# Patient Record
Sex: FEMALE | Race: BLACK OR AFRICAN AMERICAN | Employment: FULL TIME | URBAN - METROPOLITAN AREA
[De-identification: names, ages, dates, MRNs, and addresses within clinical notes are randomized per-mention and may not be internally consistent; named-entity substitution may affect disease eponyms.]

---

## 2017-03-13 DIAGNOSIS — G43.009 MIGRAINE WITHOUT AURA AND WITHOUT STATUS MIGRAINOSUS, NOT INTRACTABLE: ICD-10-CM

## 2017-03-14 RX ORDER — BUTALBITAL, ACETAMINOPHEN AND CAFFEINE 50; 325; 40 MG/1; MG/1; MG/1
TABLET ORAL
Qty: 30 TAB | Refills: 2 | OUTPATIENT
Start: 2017-03-14 | End: 2018-01-15 | Stop reason: SDUPTHER

## 2017-03-23 ENCOUNTER — OFFICE VISIT (OUTPATIENT)
Dept: INTERNAL MEDICINE CLINIC | Age: 47
End: 2017-03-23

## 2017-03-23 VITALS
WEIGHT: 172.4 LBS | BODY MASS INDEX: 31.73 KG/M2 | DIASTOLIC BLOOD PRESSURE: 68 MMHG | HEART RATE: 81 BPM | RESPIRATION RATE: 19 BRPM | TEMPERATURE: 97.9 F | SYSTOLIC BLOOD PRESSURE: 100 MMHG | HEIGHT: 62 IN | OXYGEN SATURATION: 98 %

## 2017-03-23 DIAGNOSIS — F51.01 PRIMARY INSOMNIA: Primary | ICD-10-CM

## 2017-03-23 NOTE — MR AVS SNAPSHOT
Visit Information Date & Time Provider Department Dept. Phone Encounter #  
 3/23/2017  2:00 PM [facility] [id number] Upcoming Health Maintenance Date Due DTaP/Tdap/Td series (1 - Tdap) 8/30/1989 INFLUENZA AGE 9 TO ADULT 8/1/2016 PAP AKA CERVICAL CYTOLOGY 11/12/2017 Allergies as of 3/23/2017  Review Complete On: 3/23/2017 By: Boris Olivera MD  
  
 Severity Noted Reaction Type Reactions Shellfish Derived  12/23/2014    Hives, Itching, Swelling Eye swelling Sulfa (Sulfonamide Antibiotics)  12/23/2014    Other (comments) Abdominal pain Current Immunizations  Never Reviewed Name Date Influenza Vaccine 11/1/2014 Not reviewed this visit You Were Diagnosed With   
  
 Codes Comments Primary insomnia    -  Primary ICD-10-CM: F51.01 
ICD-9-CM: 307.42 Vitals BP Pulse Temp Resp Height(growth percentile) Weight(growth percentile) 100/68 (BP 1 Location: Right arm, BP Patient Position: Sitting) 81 97.9 °F (36.6 °C) (Oral) 19 5' 2\" (1.575 m) 172 lb 6.4 oz (78.2 kg) LMP SpO2 BMI OB Status Smoking Status 03/13/2017 98% 31.53 kg/m2 Having regular periods Never Smoker Vitals History BMI and BSA Data Body Mass Index Body Surface Area  
 31.53 kg/m 2 1.85 m 2 Preferred Pharmacy Pharmacy Name Phone [id number]- [facility] [address] [phone] Your Updated Medication List  
  
   
This list is accurate as of: 3/23/17  2:59 PM.  Always use your most recent med list.  
  
  
  
  
 butalbital-acetaminophen-caffeine -40 mg per tablet Commonly known as:  FIORICET, ESGIC  
TAKE 1 TABLET EVERY 4 HOURS AS NEEDED FOR HEADACHE * ibuprofen 800 mg tablet Commonly known as:  MOTRIN Take 1 Tab by mouth every eight (8) hours as needed for Pain. * ibuprofen 800 mg tablet Commonly known as:  MOTRIN  
TAKE 1 TABLET BY MOUTH EVERY 8 HOURS AS NEEDED FOR PAIN  
  
 melatonin Tab tablet Take  by mouth nightly. multivitamin tablet Commonly known as:  ONE A DAY Take 1 tablet by mouth daily. suvorexant 5 mg tablet Commonly known as:  [place] Take 1 Tab by mouth nightly as needed for Insomnia. Max Daily Amount: 5 mg.  
  
 tolterodine ER 2 mg ER capsule Commonly known as:  DETROL-LA  
TAKE ONE CAPSULE EVERY DAY  
  
 * Notice: This list has 2 medication(s) that are the same as other medications prescribed for you. Read the directions carefully, and ask your doctor or other care provider to review them with you. Prescriptions Printed Refills  
 suvorexant (BELSOMRA) 5 mg tablet 3 Sig: Take 1 Tab by mouth nightly as needed for Insomnia. Max Daily Amount: 5 mg. Class: Print Route: Oral  
  
Patient Instructions Suvorexant (By mouth) Suvorexant (gic-dak-QYG-ant) Treats insomnia. Brand Name(s):Belsomra There may be other brand names for this medicine. When This Medicine Should Not Be Used: This medicine is not right for everyone. Do not use it if you have narcolepsy. How to Use This Medicine:  
Tablet · Your doctor will tell you how much medicine to use. Do not use more than directed. · You should not take this medicine if you are not able to sleep or rest for at least 7 hours before you need to be active again. · This medicine works better if you do not take it with food or right after a meal. 
· This medicine should come with a Medication Guide. Ask your pharmacist for a copy if you do not have one. · Missed dose: This medicine is not taken on a regular schedule. Use it only when you cannot sleep. · Store the medicine in a closed container at room temperature, away from heat, moisture, and direct light. Do not remove the tablets from the blister pack until you are ready to use them. Drugs and Foods to Avoid: Ask your doctor or pharmacist before using any other medicine, including over-the-counter medicines, vitamins, and herbal products. · Some foods and medicines can affect how suvorexant works. Tell your doctor if you are using any of the following: 
¨ Aprepitant, boceprevir, carbamazepine, ciprofloxacin, clarithromycin, conivaptan, digoxin, diltiazem, erythromycin, fluconazole, imatinib, nefazodone, phenytoin, rifampin, telaprevir, telithromycin, verapamil ¨ Medicine to treat HIV (such as amprenavir, atazanavir, fosamprenavir, indinavir, nelfinavir, ritonavir, saquinavir) or medicine to treat a fungal infection (such as itraconazole, ketoconazole, posaconazole) · Tell your doctor if you use anything else that makes you sleepy. Some examples are allergy medicine, narcotic pain medicine, and alcohol. · Do not eat grapefruit or drink grapefruit juice while you are using this medicine. Warnings While Using This Medicine: · Tell your doctor if you are pregnant or breastfeeding, or if you have liver disease, breathing or lung problems (such as COPD, sleep apnea), or muscle problems or weakness. Tell your doctor if you have a history of alcohol or drug addiction, depression, or mental illness. · This medicine may cause the following problems: ¨ Unusual changes in mood or behavior ¨ Sleep paralysis (while you are going to sleep or waking up) · This medicine may make you drowsy the next morning. Do not drive or do anything else that could be dangerous until you know how this medicine affects you. · This medicine may cause you to do things while you are still asleep, such as driving or eating. You may not remember doing these things the next morning. Tell your doctor right away if you learn that this has happened. · Call your doctor if you still have trouble sleeping after you take this medicine for 7 to 10 days. · This medicine can be habit-forming.  Do not use more than your prescribed dose. Call your doctor if you think your medicine is not working. · Keep all medicine out of the reach of children. Never share your medicine with anyone. Possible Side Effects While Using This Medicine:  
Call your doctor right away if you notice any of these side effects: · Allergic reaction: Itching or hives, swelling in your face or hands, swelling or tingling in your mouth or throat, chest tightness, trouble breathing · Anxiety, depression, nervousness, unusual behavior, or thoughts of hurting yourself · Memory loss · Seeing, hearing, or feeling things that are not there · Severe confusion, drowsiness, muscle weakness · Temporary inability to move or talk while you are going to sleep or waking up If you notice these less serious side effects, talk with your doctor: · Daytime drowsiness If you notice other side effects that you think are caused by this medicine, tell your doctor. Call your doctor for medical advice about side effects. You may report side effects to FDA at 3-305-FDA-2463 © 2016 [address] is for End User's use only and may not be sold, redistributed or otherwise used for commercial purposes. The above information is an  only. It is not intended as medical advice for individual conditions or treatments. Talk to your doctor, nurse or pharmacist before following any medical regimen to see if it is safe and effective for you. Introducing [facility]! Shira Queen introduces [employer] patient portal. Now you can access parts of your medical record, email your doctor's office, and request medication refills online. 1. In your internet browser, go to [email or web address] 2. Click on the First Time User? Click Here link in the Sign In box. You will see the New Member Sign Up page. 3. Enter your [employer] Access Code exactly as it appears below.  You will not need to use this code after youve completed the sign-up process. If you do not sign up before the expiration date, you must request a new code. · M[employer] Access Code: [email or web address] Expires: 6/21/2017  1:58 PM 
 
4. Enter the last four digits of your Social Security Number (xxxx) and Date of Birth (mm/dd/yyyy) as indicated and click Submit. You will be taken to the next sign-up page. 5. Create a Mimeo ID. This will be your Mimeo login ID and cannot be changed, so think of one that is secure and easy to remember. 6. Create a M[employer] password. You can change your password at any time. 7. Enter your Password Reset Question and Answer. This can be used at a later time if you forget your password. 8. Enter your e-mail address. You will receive e-mail notification when new information is available in [address]. 9. Click Sign Up. You can now view and download portions of your medical record. 10. Click the Download Summary menu link to download a portable copy of your medical information. If you have questions, please visit the Frequently Asked Questions section of the M[employer] website. Remember, [employer] is NOT to be used for urgent needs. For medical emergencies, dial 911. Now available from your iPhone and Android! Please provide this summary of care documentation to your next provider. Your primary care clinician is listed as Mary Pandya. If you have any questions after today's visit, please call [phone].

## 2017-03-23 NOTE — PATIENT INSTRUCTIONS
Suvorexant (By mouth)   Suvorexant (otw-vff-NRR-ant)  Treats insomnia. Brand Name(s):Belsomra   There may be other brand names for this medicine. When This Medicine Should Not Be Used: This medicine is not right for everyone. Do not use it if you have narcolepsy. How to Use This Medicine:   Tablet  · Your doctor will tell you how much medicine to use. Do not use more than directed. · You should not take this medicine if you are not able to sleep or rest for at least 7 hours before you need to be active again. · This medicine works better if you do not take it with food or right after a meal.  · This medicine should come with a Medication Guide. Ask your pharmacist for a copy if you do not have one. · Missed dose: This medicine is not taken on a regular schedule. Use it only when you cannot sleep. · Store the medicine in a closed container at room temperature, away from heat, moisture, and direct light. Do not remove the tablets from the blister pack until you are ready to use them. Drugs and Foods to Avoid:   Ask your doctor or pharmacist before using any other medicine, including over-the-counter medicines, vitamins, and herbal products. · Some foods and medicines can affect how suvorexant works. Tell your doctor if you are using any of the following:  ¨ Aprepitant, boceprevir, carbamazepine, ciprofloxacin, clarithromycin, conivaptan, digoxin, diltiazem, erythromycin, fluconazole, imatinib, nefazodone, phenytoin, rifampin, telaprevir, telithromycin, verapamil  ¨ Medicine to treat HIV (such as amprenavir, atazanavir, fosamprenavir, indinavir, nelfinavir, ritonavir, saquinavir) or medicine to treat a fungal infection (such as itraconazole, ketoconazole, posaconazole)  · Tell your doctor if you use anything else that makes you sleepy. Some examples are allergy medicine, narcotic pain medicine, and alcohol. · Do not eat grapefruit or drink grapefruit juice while you are using this medicine.   Warnings While Using This Medicine:   · Tell your doctor if you are pregnant or breastfeeding, or if you have liver disease, breathing or lung problems (such as COPD, sleep apnea), or muscle problems or weakness. Tell your doctor if you have a history of alcohol or drug addiction, depression, or mental illness. · This medicine may cause the following problems:  ¨ Unusual changes in mood or behavior  ¨ Sleep paralysis (while you are going to sleep or waking up)  · This medicine may make you drowsy the next morning. Do not drive or do anything else that could be dangerous until you know how this medicine affects you. · This medicine may cause you to do things while you are still asleep, such as driving or eating. You may not remember doing these things the next morning. Tell your doctor right away if you learn that this has happened. · Call your doctor if you still have trouble sleeping after you take this medicine for 7 to 10 days. · This medicine can be habit-forming. Do not use more than your prescribed dose. Call your doctor if you think your medicine is not working. · Keep all medicine out of the reach of children. Never share your medicine with anyone. Possible Side Effects While Using This Medicine:   Call your doctor right away if you notice any of these side effects:  · Allergic reaction: Itching or hives, swelling in your face or hands, swelling or tingling in your mouth or throat, chest tightness, trouble breathing  · Anxiety, depression, nervousness, unusual behavior, or thoughts of hurting yourself  · Memory loss  · Seeing, hearing, or feeling things that are not there  · Severe confusion, drowsiness, muscle weakness  · Temporary inability to move or talk while you are going to sleep or waking up  If you notice these less serious side effects, talk with your doctor:   · Daytime drowsiness  If you notice other side effects that you think are caused by this medicine, tell your doctor.    Call your doctor for medical advice about side effects. You may report side effects to FDA at 2-930-JIB-7046  © 2016 [address] is for End User's use only and may not be sold, redistributed or otherwise used for commercial purposes. The above information is an  only. It is not intended as medical advice for individual conditions or treatments. Talk to your doctor, nurse or pharmacist before following any medical regimen to see if it is safe and effective for you.

## 2017-03-23 NOTE — PROGRESS NOTES
Acute Care Note    Shamika Gunter is 48 y.o. female. she presents for evaluation of Insomnia and Urinary Frequency    Mental Health Review  Patient is seen for sleep disturbance. She has not been sleeping well. Current treatment includes melatonin. Ongoing symptoms include: psychomotor agitation. She denies: palpitations, chest pain, shortness of breath. Reported side effects from the treatment: none. She is also having some urination frequency at night. Questioning if any further issues. Prior to Admission medications    Medication Sig Start Date End Date Taking? Authorizing Provider   butalbital-acetaminophen-caffeine (FIORICET, ESGIC) -40 mg per tablet TAKE 1 TABLET EVERY 4 HOURS AS NEEDED FOR HEADACHE 3/14/17  Yes Mayela Santamaria MD   tolterodine ER (DETROL-LA) 2 mg ER capsule TAKE ONE CAPSULE EVERY DAY 12/1/16  Yes Mayela Santamaria MD   ibuprofen (MOTRIN) 800 mg tablet TAKE 1 TABLET BY MOUTH EVERY 8 HOURS AS NEEDED FOR PAIN 9/20/16  Yes Mayela Santamaria MD   ibuprofen (MOTRIN) 800 mg tablet Take 1 Tab by mouth every eight (8) hours as needed for Pain. 8/22/16  Yes Mayela Santamaria MD   melatonin tab tablet Take  by mouth nightly. Yes Historical Provider   multivitamin (ONE A DAY) tablet Take 1 tablet by mouth daily. Yes Historical Provider         Patient Active Problem List   Diagnosis Code    Depression F32.9    Migraines G43.909    HTN (hypertension) I10    Obesity E66.9         Review of Systems   Constitutional: Negative. Cardiovascular: Negative. Psychiatric/Behavioral: The patient has insomnia. Visit Vitals    /68 (BP 1 Location: Right arm, BP Patient Position: Sitting)    Pulse 81    Temp 97.9 °F (36.6 °C) (Oral)    Resp 19    Ht 5' 2\" (1.575 m)    Wt 172 lb 6.4 oz (78.2 kg)    LMP 03/13/2017    SpO2 98%    BMI 31.53 kg/m2       Physical Exam   Constitutional: She appears well-developed and well-nourished. Cardiovascular: Normal rate and regular rhythm. Pulmonary/Chest: Effort normal and breath sounds normal.           ASSESSMENT/PLAN  Alvarez was seen today for insomnia and urinary frequency. Diagnoses and all orders for this visit:    Primary insomnia  -     suvorexant (BELSOMRA) 5 mg tablet; Take 1 Tab by mouth nightly as needed for Insomnia. Max Daily Amount: 5 mg. Advised the patient to call back or return to office if symptoms worsen/change/persist.   Discussed expected course/resolution/complications of diagnosis in detail with patient. Medication risks/benefits/costs/interactions/alternatives discussed with patient. The patient was given an after visit summary which includes diagnoses, current medications, & vitals. They expressed understanding with the diagnosis and plan.

## 2017-03-23 NOTE — PROGRESS NOTES
Patient is here c/o severe insomnia. She is also c/o urine frequency at night causing sleep disturbance.

## 2017-05-04 DIAGNOSIS — M25.559 ARTHRALGIA OF HIP, UNSPECIFIED LATERALITY: ICD-10-CM

## 2017-05-04 RX ORDER — IBUPROFEN 800 MG/1
TABLET ORAL
Qty: 30 TAB | Refills: 0 | Status: SHIPPED | OUTPATIENT
Start: 2017-05-04 | End: 2017-08-07 | Stop reason: SDUPTHER

## 2017-05-08 RX ORDER — IBUPROFEN 800 MG/1
TABLET ORAL
Qty: 30 TAB | Refills: 0 | OUTPATIENT
Start: 2017-05-08

## 2017-06-07 RX ORDER — TOLTERODINE 2 MG/1
CAPSULE, EXTENDED RELEASE ORAL
Qty: 90 CAP | Refills: 1 | Status: SHIPPED | OUTPATIENT
Start: 2017-06-07 | End: 2017-11-16 | Stop reason: SDUPTHER

## 2017-08-07 ENCOUNTER — OFFICE VISIT (OUTPATIENT)
Dept: INTERNAL MEDICINE CLINIC | Age: 47
End: 2017-08-07

## 2017-08-07 VITALS
SYSTOLIC BLOOD PRESSURE: 120 MMHG | HEIGHT: 62 IN | TEMPERATURE: 98 F | HEART RATE: 86 BPM | OXYGEN SATURATION: 98 % | WEIGHT: 178 LBS | BODY MASS INDEX: 32.76 KG/M2 | DIASTOLIC BLOOD PRESSURE: 80 MMHG | RESPIRATION RATE: 19 BRPM

## 2017-08-07 DIAGNOSIS — M25.559 ARTHRALGIA OF HIP, UNSPECIFIED LATERALITY: ICD-10-CM

## 2017-08-07 DIAGNOSIS — R51.9 TEMPORAL PAIN: Primary | ICD-10-CM

## 2017-08-07 DIAGNOSIS — F51.01 PRIMARY INSOMNIA: ICD-10-CM

## 2017-08-07 RX ORDER — IBUPROFEN 800 MG/1
TABLET ORAL
Qty: 30 TAB | Refills: 0 | Status: SHIPPED | OUTPATIENT
Start: 2017-08-07 | End: 2018-02-12 | Stop reason: ALTCHOICE

## 2017-08-07 RX ORDER — PREDNISONE 10 MG/1
10 TABLET ORAL SEE ADMIN INSTRUCTIONS
Qty: 21 TAB | Refills: 0 | Status: SHIPPED | OUTPATIENT
Start: 2017-08-07 | End: 2017-11-16 | Stop reason: ALTCHOICE

## 2017-08-07 NOTE — MR AVS SNAPSHOT
Visit Information Date & Time Provider Department Dept. Phone Encounter #  
 8/7/2017  3:45 PM [address] Internal Medicine [phone] [id number] Follow-up Instructions Return if symptoms worsen or fail to improve. Upcoming Health Maintenance Date Due DTaP/Tdap/Td series (1 - Tdap) 8/30/1989 INFLUENZA AGE 9 TO ADULT 8/1/2017 PAP AKA CERVICAL CYTOLOGY 11/12/2017 Allergies as of 8/7/2017  Review Complete On: 8/7/2017 By: Jeet Avalos MD  
  
 Severity Noted Reaction Type Reactions Shellfish Derived  12/23/2014    Hives, Itching, Swelling Eye swelling Sulfa (Sulfonamide Antibiotics)  12/23/2014    Other (comments) Abdominal pain Current Immunizations  Never Reviewed Name Date Influenza Vaccine 11/1/2014 Not reviewed this visit You Were Diagnosed With   
  
 Codes Comments Temporal pain    -  Primary ICD-10-CM: S94 ICD-9-CM: 784.0 Arthralgia of hip, unspecified laterality     ICD-10-CM: M25.559 ICD-9-CM: 719.45 Primary insomnia     ICD-10-CM: F51.01 
ICD-9-CM: 307.42 Vitals BP Pulse Temp Resp Height(growth percentile) Weight(growth percentile) 120/80 (BP 1 Location: Right arm, BP Patient Position: Sitting) 86 98 °F (36.7 °C) (Oral) 19 5' 2\" (1.575 m) 178 lb (80.7 kg) LMP SpO2 BMI OB Status Smoking Status 08/01/2017 98% 32.56 kg/m2 Having regular periods Never Smoker BMI and BSA Data Body Mass Index Body Surface Area 32.56 kg/m 2 1.88 m 2 Preferred Pharmacy Pharmacy Name Phone [facility]/PHARMACY #[id number], [place] - [address] [phone] Your Updated Medication List  
  
   
This list is accurate as of: 8/7/17  4:36 PM.  Always use your most recent med list.  
  
  
  
  
 butalbital-acetaminophen-caffeine -40 mg per tablet Commonly known as:  Cassi Benson  
 TAKE 1 TABLET EVERY 4 HOURS AS NEEDED FOR HEADACHE * ibuprofen 800 mg tablet Commonly known as:  MOTRIN Take 1 Tab by mouth every eight (8) hours as needed for Pain. * ibuprofen 800 mg tablet Commonly known as:  MOTRIN  
TAKE 1 TABLET BY MOUTH EVERY 8 HOURS AS NEEDED FOR PAIN  
  
 melatonin Tab tablet Take  by mouth nightly. multivitamin tablet Commonly known as:  ONE A DAY Take 1 tablet by mouth daily. predniSONE 10 mg dose pack Commonly known as:  STERAPRED DS Take 1 Tab by mouth See Admin Instructions. See administration instruction per 10mg dose pack  
  
 suvorexant 5 mg tablet Commonly known as:  Jon Willoughby Take 1 Tab by mouth nightly as needed for Insomnia. Max Daily Amount: 5 mg.  
  
 tolterodine ER 2 mg ER capsule Commonly known as:  DETROL-LA  
TAKE ONE CAPSULE EVERY DAY  
  
 * Notice: This list has 2 medication(s) that are the same as other medications prescribed for you. Read the directions carefully, and ask your doctor or other care provider to review them with you. Prescriptions Printed Refills  
 suvorexant (BELSOMRA) 5 mg tablet 3 Sig: Take 1 Tab by mouth nightly as needed for Insomnia. Max Daily Amount: 5 mg. Class: Print Route: Oral  
  
Prescriptions Sent to Pharmacy Refills  
 ibuprofen (MOTRIN) 800 mg tablet 0 Sig: TAKE 1 TABLET BY MOUTH EVERY 8 HOURS AS NEEDED FOR PAIN Class: Normal  
 Pharmacy: [facility]/pharmacy [address] [place] [address] [address] [place] [address] Ph #: [phone]  
 predniSONE (STERAPRED DS) 10 mg dose pack 0 Sig: Take 1 Tab by mouth See Admin Instructions. See administration instruction per 10mg dose pack Class: Normal  
 Pharmacy: [facility]/pharmacy [address] [place] [address] [address] [place] [address] Ph #: [phone] Route: Oral  
  
We Performed the Following CRP, HIGH SENSITIVITY [07712 CPT(R)] SED RATE (ESR) [id number] CPT(R)] Follow-up Instructions Return if symptoms worsen or fail to improve. Patient Instructions Head or Face Pain: Care Instructions Your Care Instructions Common causes of head or face pain are allergies, stress, and injuries. Other causes include tooth problems and sinus infections. Eating certain foods, such as chocolate or cheese, or drinking certain liquids, such as coffee or cola, can cause head pain for some people. If you have mild head pain, you may not need treatment. It is important to watch your symptoms and talk to your doctor if your pain continues or gets worse. Follow-up care is a key part of your treatment and safety. Be sure to make and go to all appointments, and call your doctor if you are having problems. It's also a good idea to know your test results and keep a list of the medicines you take. How can you care for yourself at home? · Take pain medicines exactly as directed. ¨ If the doctor gave you a prescription medicine for pain, take it as prescribed. ¨ If you are not taking a prescription pain medicine, ask your doctor if you can take an over-the-counter pain medicine. · Take it easy for the next few days or longer if you are not feeling well. · Use a warm, moist towel or heating pad set on low to relax tight muscles in your shoulder and neck. Have someone gently massage your neck and shoulders. · Put ice or a cold pack on the area for 10 to 20 minutes at a time. Put a thin cloth between the ice and your skin. When should you call for help? Call 911 anytime you think you may need emergency care. For example, call if: 
· You have twitching, jerking, or a seizure. · You passed out (lost consciousness). · You have symptoms of a stroke. These may include: 
¨ Sudden numbness, tingling, weakness, or loss of movement in your face, arm, or leg, especially on only one side of your body. ¨ Sudden vision changes. ¨ Sudden trouble speaking. ¨ Sudden confusion or trouble understanding simple statements. ¨ Sudden problems with walking or balance. ¨ A sudden, severe headache that is different from past headaches. · You have jaw pain and pain in your chest, shoulder, neck, or arm. Call your doctor now or seek immediate medical care if: 
· You have a fever with a stiff neck or a severe headache. · You have nausea and vomiting, or you cannot keep food or liquids down. Watch closely for changes in your health, and be sure to contact your doctor if: 
· Your head or face pain does not get better as expected. Where can you learn more? Go to http://octavio-yuliana.info/. Enter P568 in the search box to learn more about \"Head or Face Pain: Care Instructions. \" Current as of: March 20, 2017 Content Version: 11.3 © 8538-2121 [employer]. Care instructions adapted under license by [employer] (which disclaims liability or warranty for this information). If you have questions about a medical condition or this instruction, always ask your healthcare professional. [place] [address] any warranty or liability for your use of this information. Introducing [facility]! [employer] introduces Cloakroom patient portal. Now you can access parts of your medical record, email your doctor's office, and request medication refills online. 1. In your internet browser, go to [email or web address] 2. Click on the First Time User? Click Here link in the Sign In box. You will see the New Member Sign Up page. 3. Enter your Cloakroom Access Code exactly as it appears below. You will not need to use this code after youve completed the sign-up process. If you do not sign up before the expiration date, you must request a new code. · Cloakroom Access Code: [email or web address] Expires: 11/5/2017  9:30 AM 
 
4.  Enter the last four digits of your Social Security Number (xxxx) and Date of Birth (mm/dd/yyyy) as indicated and click Submit. You will be taken to the next sign-up page. 5. Create a HyperBees ID. This will be your HyperBees login ID and cannot be changed, so think of one that is secure and easy to remember. 6. Create a HyperBees password. You can change your password at any time. 7. Enter your Password Reset Question and Answer. This can be used at a later time if you forget your password. 8. Enter your e-mail address. You will receive e-mail notification when new information is available in [address] 9. Click Sign Up. You can now view and download portions of your medical record. 10. Click the Download Summary menu link to download a portable copy of your medical information. If you have questions, please visit the Frequently Asked Questions section of the HyperBees website. Remember, HyperBees is NOT to be used for urgent needs. For medical emergencies, dial 911. Now available from your iPhone and Android! Please provide this summary of care documentation to your next provider. Your primary care clinician is listed as Mina Rodriguez. If you have any questions after today's visit, please call [phone].

## 2017-08-07 NOTE — Clinical Note
Can we follow up with the results of these labs? She had them done at [facility].

## 2017-08-07 NOTE — PROGRESS NOTES
Acute Care Note    Huey Yañez is 48 y.o. female. she presents for evaluation of Tingling    Noted tingling on her anterior scalp for the past few months. She also has had some increased headache. She had monitor this as he was unsure as to the cause. 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 This after having noted some pain with palpation over the left temporal artery. The patient presents to the clinic today for evaluation of this. Otherwise, she is also asking for refills of Belsomra for her insomnia. Also Motrin. Prior to Admission medications    Medication Sig Start Date End Date Taking? Authorizing Provider   tolterodine ER (DETROL-LA) 2 mg ER capsule TAKE ONE CAPSULE EVERY DAY 6/7/17  Yes Luis De La Cruz MD   ibuprofen (MOTRIN) 800 mg tablet TAKE 1 TABLET BY MOUTH EVERY 8 HOURS AS NEEDED FOR PAIN 5/4/17  Yes Luis De La Cruz MD   suvorexant (BELSOMRA) 5 mg tablet Take 1 Tab by mouth nightly as needed for Insomnia. Max Daily Amount: 5 mg. 3/23/17  Yes Luis De La Cruz MD   butalbital-acetaminophen-caffeine [facility], ESGIC) -40 mg per tablet TAKE 1 TABLET EVERY 4 HOURS AS NEEDED FOR HEADACHE 3/14/17  Yes Luis De La Cruz MD   ibuprofen (MOTRIN) 800 mg tablet Take 1 Tab by mouth every eight (8) hours as needed for Pain. 8/22/16  Yes Luis De La Cruz MD   melatonin tab tablet Take  by mouth nightly. Yes Historical Provider   multivitamin (ONE A DAY) tablet Take 1 tablet by mouth daily. Yes Historical Provider         Patient Active Problem List   Diagnosis Code    Depression F32.9    Migraines G43.909    HTN (hypertension) I10    Obesity E66.9         Review of Systems   Constitutional: Negative. Respiratory: Negative. Cardiovascular: Negative. Neurological: Positive for headaches.          Visit Vitals    /80 (BP 1 Location: Right arm, BP Patient Position: Sitting)    Pulse 86    Temp 98 °F (36.7 °C) (Oral)    Resp 19    Ht 5' 2\" (1.575 m)    Wt 178 lb (80.7 kg)    LMP 08/01/2017    SpO2 98%    BMI 32.56 kg/m2       Physical Exam   HENT:   Pain with palpation over the left temporal artery. Scalp sensitivity at the left parietal area. No skin lesion noted on the scalp. ASSESSMENT/PLAN  Diagnoses and all orders for this visit:    1. Temporal pain- given the temporal artery pain, we will treat empirically. Also will obtain a CRP and ESR. Follow up results. -     predniSONE (STERAPRED DS) 10 mg dose pack; Take 1 Tab by mouth See Admin Instructions. See administration instruction per 10mg dose pack  -     CRP, HIGH SENSITIVITY  -     SED RATE (ESR)    2. Arthralgia of hip, unspecified laterality  -     ibuprofen (MOTRIN) 800 mg tablet; TAKE 1 TABLET BY MOUTH EVERY 8 HOURS AS NEEDED FOR PAIN    3. Primary insomnia  -     suvorexant (BELSOMRA) 5 mg tablet; Take 1 Tab by mouth nightly as needed for Insomnia. Max Daily Amount: 5 mg. Advised the patient to call back or return to office if symptoms worsen/change/persist.   Discussed expected course/resolution/complications of diagnosis in detail with patient. Medication risks/benefits/costs/interactions/alternatives discussed with patient. The patient was given an after visit summary which includes diagnoses, current medications, & vitals. They expressed understanding with the diagnosis and plan.

## 2017-08-07 NOTE — PROGRESS NOTES
Pt is here concerned about a tingling sensation on the top of her left head. Pt feels as if it might be Temporal arteritis per an MD that works with her.

## 2017-08-07 NOTE — PATIENT INSTRUCTIONS
Head or Face Pain: Care Instructions  Your Care Instructions  Common causes of head or face pain are allergies, stress, and injuries. Other causes include tooth problems and sinus infections. Eating certain foods, such as chocolate or cheese, or drinking certain liquids, such as coffee or cola, can cause head pain for some people. If you have mild head pain, you may not need treatment. It is important to watch your symptoms and talk to your doctor if your pain continues or gets worse. Follow-up care is a key part of your treatment and safety. Be sure to make and go to all appointments, and call your doctor if you are having problems. It's also a good idea to know your test results and keep a list of the medicines you take. How can you care for yourself at home? · Take pain medicines exactly as directed. ¨ If the doctor gave you a prescription medicine for pain, take it as prescribed. ¨ If you are not taking a prescription pain medicine, ask your doctor if you can take an over-the-counter pain medicine. · Take it easy for the next few days or longer if you are not feeling well. · Use a warm, moist towel or heating pad set on low to relax tight muscles in your shoulder and neck. Have someone gently massage your neck and shoulders. · Put ice or a cold pack on the area for 10 to 20 minutes at a time. Put a thin cloth between the ice and your skin. When should you call for help? Call 911 anytime you think you may need emergency care. For example, call if:  · You have twitching, jerking, or a seizure. · You passed out (lost consciousness). · You have symptoms of a stroke. These may include:  ¨ Sudden numbness, tingling, weakness, or loss of movement in your face, arm, or leg, especially on only one side of your body. ¨ Sudden vision changes. ¨ Sudden trouble speaking. ¨ Sudden confusion or trouble understanding simple statements. ¨ Sudden problems with walking or balance.   ¨ A sudden, severe headache that is different from past headaches. · You have jaw pain and pain in your chest, shoulder, neck, or arm. Call your doctor now or seek immediate medical care if:  · You have a fever with a stiff neck or a severe headache. · You have nausea and vomiting, or you cannot keep food or liquids down. Watch closely for changes in your health, and be sure to contact your doctor if:  · Your head or face pain does not get better as expected. Where can you learn more? Go to http://octavio-yuliana.info/. Enter P568 in the search box to learn more about \"Head or Face Pain: Care Instructions. \"  Current as of: March 20, 2017  Content Version: 11.3  © 6568-6424 [employer]. Care instructions adapted under license by [employer] (which disclaims liability or warranty for this information). If you have questions about a medical condition or this instruction, always ask your healthcare professional. [address] any warranty or liability for your use of this information.

## 2017-08-09 ENCOUNTER — TELEPHONE (OUTPATIENT)
Dept: INTERNAL MEDICINE CLINIC | Age: 47
End: 2017-08-09

## 2017-08-09 NOTE — TELEPHONE ENCOUNTER
Pt wants to know if her labs were sent over from [facility] they were done yesterday

## 2017-08-09 NOTE — TELEPHONE ENCOUNTER
Pt made aware of blood work still not available to view and will get back to her within the next 24-48 hours; pt verbalized understanding. Pt also stated feeling much better.

## 2017-08-10 ENCOUNTER — TELEPHONE (OUTPATIENT)
Dept: INTERNAL MEDICINE CLINIC | Age: 47
End: 2017-08-10

## 2017-08-10 NOTE — TELEPHONE ENCOUNTER
Patient has been informed per drs result notes and recommendations, pt verbalizes understanding. She does not have a headache today, will continue to take steroids and monitor her headache and call back in the morning if needed.

## 2017-08-17 ENCOUNTER — TELEPHONE (OUTPATIENT)
Dept: INTERNAL MEDICINE CLINIC | Age: 47
End: 2017-08-17

## 2017-11-16 ENCOUNTER — OFFICE VISIT (OUTPATIENT)
Dept: INTERNAL MEDICINE CLINIC | Age: 47
End: 2017-11-16

## 2017-11-16 VITALS
WEIGHT: 169.8 LBS | DIASTOLIC BLOOD PRESSURE: 80 MMHG | BODY MASS INDEX: 31.25 KG/M2 | HEIGHT: 62 IN | SYSTOLIC BLOOD PRESSURE: 120 MMHG | HEART RATE: 78 BPM | TEMPERATURE: 96.7 F | OXYGEN SATURATION: 98 % | RESPIRATION RATE: 19 BRPM

## 2017-11-16 DIAGNOSIS — N92.6 IRREGULAR PERIODS: ICD-10-CM

## 2017-11-16 DIAGNOSIS — F41.9 ANXIETY: Primary | ICD-10-CM

## 2017-11-16 DIAGNOSIS — R35.0 URINARY FREQUENCY: ICD-10-CM

## 2017-11-16 RX ORDER — TOLTERODINE 2 MG/1
4 CAPSULE, EXTENDED RELEASE ORAL
Qty: 180 CAP | Refills: 1 | Status: SHIPPED | OUTPATIENT
Start: 2017-11-16 | End: 2018-01-15 | Stop reason: SDUPTHER

## 2017-11-16 NOTE — PROGRESS NOTES
Follow Up Visit    Jonathan Jasmine is a 50 y.o. female. she presents for Menopause; Anxiety; and Medication Evaluation    The pt reports having that she is having some having some irregular periods. She is concerned that she may be transitioning to menopause. She has a period approximately every other month. She notes that the duration and volume of the menstrual cycle is normal.  She does have a gynecologist however has not seen that physician in some time and is concerned about her interaction and relationship with that provider. She is also concerned about some anxiety which she has been experiencing as well as difficulty concentrating. 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 Been advised to take several over-the-counter supplements by her friends however she is not begun these as of yet. She is asking for guidance in this regard. She also has had ongoing problems with insomnia. She has tried A[employer]. Moore however this has been too expensive for her to maintain. Patient Active Problem List   Diagnosis Code    Depression F32.9    Migraines G43.909    HTN (hypertension) I10    Obesity E66.9         Prior to Admission medications    Medication Sig Start Date End Date Taking? Authorizing Provider   ibuprofen (MOTRIN) 800 mg tablet TAKE 1 TABLET BY MOUTH EVERY 8 HOURS AS NEEDED FOR PAIN 8/7/17  Yes Humble Tate MD   tolterodine ER (DETROL-LA) 2 mg ER capsule TAKE ONE CAPSULE EVERY DAY 6/7/17  Yes Humble Tate MD   butalbital-acetaminophen-caffeine (FIORICET, ESGIC) -40 mg per tablet TAKE 1 TABLET EVERY 4 HOURS AS NEEDED FOR HEADACHE 3/14/17  Yes Humble Tate MD   ibuprofen (MOTRIN) 800 mg tablet Take 1 Tab by mouth every eight (8) hours as needed for Pain. 8/22/16  Yes Humble Tate MD   melatonin tab tablet Take  by mouth nightly.    Yes Historical Provider   multivitamin (ONE A DAY) tablet Take 1 tablet by mouth daily. Yes Historical Provider   suvorexant (BELSOMRA) 5 mg tablet Take 1 Tab by mouth nightly as needed for Insomnia. Max Daily Amount: 5 mg. 8/7/17   Liban Miles MD         Health Maintenance   Topic Date Due    DTaP/Tdap/Td series (1 - Tdap) 08/30/1989    Influenza Age 5 to Adult  08/01/2017    PAP AKA CERVICAL CYTOLOGY  11/12/2017    BREAST CANCER SCRN MAMMOGRAM  05/23/2018       Review of Systems   Constitutional: Negative. Respiratory: Negative. Cardiovascular: Negative. Gastrointestinal: Negative. Visit Vitals    /80 (BP 1 Location: Right arm, BP Patient Position: Sitting)    Pulse 78    Temp 96.7 °F (35.9 °C) (Oral)    Resp 19    Ht 5' 2\" (1.575 m)    Wt 169 lb 12.8 oz (77 kg)    LMP 11/03/2017    SpO2 98%    BMI 31.06 kg/m2       Physical Exam   Constitutional: No distress. Cardiovascular: Normal rate and regular rhythm. Pulmonary/Chest: Effort normal and breath sounds normal.         ASSESSMENT/PLAN    Diagnoses and all orders for this visit:    1. Anxiety -I advised the patient to try to exercise more as that will help to alleviate her anxieties. Likewise, she will try valerian root and see if there is a difference. We have elected to hold off on any pharmacological intervention at this time. 2. Irregular periods -the patient needs to follow-up with gynecology regarding this issue. I have provided her with a referral to another gynecologist for her to  be evaluated by  -     REFERRAL TO GYNECOLOGY    3. Urinary frequency -she complains of ongoing urinary frequency. We will increase the Detrol for now  -     tolterodine ER (DETROL-LA) 2 mg ER capsule; Take 2 Caps by mouth nightly. Follow-up Disposition:  Return in about 3 months (around 2/16/2018), or if symptoms worsen or fail to improve.

## 2017-11-16 NOTE — MR AVS SNAPSHOT
Visit Information Date & Time Provider Department Dept. Phone Encounter #  
 11/16/2017  8:15 AM Humble Tate MD [address] [address] Internal Medicine [phone] [id number] Follow-up Instructions Return in about 3 months (around 2/16/2018), or if symptoms worsen or fail to improve. Upcoming Health Maintenance Date Due DTaP/Tdap/Td series (1 - Tdap) 8/30/1989 Influenza Age 5 to Adult 8/1/2017 PAP AKA CERVICAL CYTOLOGY 11/12/2017 BREAST CANCER SCRN MAMMOGRAM 5/23/2018 Allergies as of 11/16/2017  Review Complete On: 11/16/2017 By: Humble Tate MD  
  
 Severity Noted Reaction Type Reactions Shellfish Derived  12/23/2014    Hives, Itching, Swelling Eye swelling Sulfa (Sulfonamide Antibiotics)  12/23/2014    Other (comments) Abdominal pain Current Immunizations  Never Reviewed Name Date Influenza Vaccine 11/1/2014 Not reviewed this visit You Were Diagnosed With   
  
 Codes Comments Anxiety    -  Primary ICD-10-CM: F41.9 ICD-9-CM: 300.00 Irregular periods     ICD-10-CM: N92.6 ICD-9-CM: 626.4 Urinary frequency     ICD-10-CM: R35.0 ICD-9-CM: 788.41 Vitals BP Pulse Temp Resp Height(growth percentile) Weight(growth percentile) 120/80 (BP 1 Location: Right arm, BP Patient Position: Sitting) 78 96.7 °F (35.9 °C) (Oral) 19 5' 2\" (1.575 m) 169 lb 12.8 oz (77 kg) LMP SpO2 BMI OB Status Smoking Status 11/03/2017 98% 31.06 kg/m2 Premenopausal Never Smoker Vitals History BMI and BSA Data Body Mass Index Body Surface Area 31.06 kg/m 2 1.84 m 2 Preferred Pharmacy Pharmacy Name Phone [facility]/PHARMACY #[id number]- [place], [place] - [address] [phone] Your Updated Medication List  
  
   
This list is accurate as of: 11/16/17  9:03 AM.  Always use your most recent med list.  
  
  
  
  
 butalbital-acetaminophen-caffeine -40 mg per tablet Commonly known as:  FIORICET, ESGIC  
TAKE 1 TABLET EVERY 4 HOURS AS NEEDED FOR HEADACHE * ibuprofen 800 mg tablet Commonly known as:  MOTRIN Take 1 Tab by mouth every eight (8) hours as needed for Pain. * ibuprofen 800 mg tablet Commonly known as:  MOTRIN  
TAKE 1 TABLET BY MOUTH EVERY 8 HOURS AS NEEDED FOR PAIN  
  
 melatonin Tab tablet Take  by mouth nightly. multivitamin tablet Commonly known as:  ONE A DAY Take 1 tablet by mouth daily. suvorexant 5 mg tablet Commonly known as:  Mercie Ni Take 1 Tab by mouth nightly as needed for Insomnia. Max Daily Amount: 5 mg.  
  
 tolterodine ER 2 mg ER capsule Commonly known as:  DETROL-LA Take 2 Caps by mouth nightly. * Notice: This list has 2 medication(s) that are the same as other medications prescribed for you. Read the directions carefully, and ask your doctor or other care provider to review them with you. Prescriptions Sent to Pharmacy Refills  
 tolterodine ER (DETROL-LA) 2 mg ER capsule 1 Sig: Take 2 Caps by mouth nightly. Class: Normal  
 Pharmacy: [facility]/pharmacy [address] [place] [address] [address] #: [phone] Route: Oral  
  
We Performed the Following REFERRAL TO GYNECOLOGY [REF30 Custom] Follow-up Instructions Return in about 3 months (around 2/16/2018), or if symptoms worsen or fail to improve. Referral Information Referral ID Referred By Referred To  
  
 [id number] Janette Sanderson MD   
   [place] [address] [address] Phone: [phone] Fax: [phone] Visits Status Start Date End Date 1 New Request 11/16/17 11/16/18 If your referral has a status of pending review or denied, additional information will be sent to support the outcome of this decision. Introducing [facility]! Aurelia Rachel introduces FlowJob patient portal. Now you can access parts of your medical record, email your doctor's office, and request medication refills online. 1. In your internet browser, go to [email or web address] 2. Click on the First Time User? Click Here link in the Sign In box. You will see the New Member Sign Up page. 3. Enter your FlowJob Access Code exactly as it appears below. You will not need to use this code after youve completed the sign-up process. If you do not sign up before the expiration date, you must request a new code. · FlowJob Access Code: [email or web address] Expires: 2/14/2018  9:03 AM 
 
4. Enter the last four digits of your Social Security Number (xxxx) and Date of Birth (mm/dd/yyyy) as indicated and click Submit. You will be taken to the next sign-up page. 5. Create a FlowJob ID. This will be your FlowJob login ID and cannot be changed, so think of one that is secure and easy to remember. 6. Create a FlowJob password. You can change your password at any time. 7. Enter your Password Reset Question and Answer. This can be used at a later time if you forget your password. 8. Enter your e-mail address. You will receive e-mail notification when new information is available in [address] 9. Click Sign Up. You can now view and download portions of your medical record. 10. Click the Download Summary menu link to download a portable copy of your medical information. If you have questions, please visit the Frequently Asked Questions section of the FlowJob website. Remember, FlowJob is NOT to be used for urgent needs. For medical emergencies, dial 911. Now available from your iPhone and Android! Please provide this summary of care documentation to your next provider. Your primary care clinician is listed as Jayson Madison. If you have any questions after today's visit, please call [phone].

## 2018-01-15 ENCOUNTER — OFFICE VISIT (OUTPATIENT)
Dept: INTERNAL MEDICINE CLINIC | Age: 48
End: 2018-01-15

## 2018-01-15 VITALS
HEART RATE: 83 BPM | RESPIRATION RATE: 19 BRPM | TEMPERATURE: 98.5 F | OXYGEN SATURATION: 98 % | HEIGHT: 62 IN | DIASTOLIC BLOOD PRESSURE: 80 MMHG | WEIGHT: 175.6 LBS | BODY MASS INDEX: 32.31 KG/M2 | SYSTOLIC BLOOD PRESSURE: 110 MMHG

## 2018-01-15 DIAGNOSIS — L20.84 INTRINSIC ECZEMA: ICD-10-CM

## 2018-01-15 DIAGNOSIS — G43.009 MIGRAINE WITHOUT AURA AND WITHOUT STATUS MIGRAINOSUS, NOT INTRACTABLE: ICD-10-CM

## 2018-01-15 DIAGNOSIS — M79.18 RIGHT BUTTOCK PAIN: Primary | ICD-10-CM

## 2018-01-15 PROBLEM — F33.9 RECURRENT DEPRESSION (HCC): Status: ACTIVE | Noted: 2018-01-15

## 2018-01-15 RX ORDER — TOLTERODINE 2 MG/1
CAPSULE, EXTENDED RELEASE ORAL
Qty: 90 CAP | Refills: 1 | Status: SHIPPED | OUTPATIENT
Start: 2018-01-15 | End: 2018-10-30

## 2018-01-15 RX ORDER — DICLOFENAC SODIUM 75 MG/1
75 TABLET, DELAYED RELEASE ORAL 2 TIMES DAILY
Qty: 30 TAB | Refills: 0 | Status: SHIPPED | OUTPATIENT
Start: 2018-01-15 | End: 2018-02-10 | Stop reason: SDUPTHER

## 2018-01-15 RX ORDER — DIPHENHYDRAMINE HCL 25 MG
25 CAPSULE ORAL
COMMUNITY

## 2018-01-15 RX ORDER — METHYLPREDNISOLONE 4 MG/1
4 TABLET ORAL
Qty: 1 DOSE PACK | Refills: 0 | Status: SHIPPED | OUTPATIENT
Start: 2018-01-15 | End: 2018-03-23 | Stop reason: ALTCHOICE

## 2018-01-15 RX ORDER — DICLOFENAC POTASSIUM 50 MG/1
50 TABLET, FILM COATED ORAL 3 TIMES DAILY
COMMUNITY
End: 2018-02-12 | Stop reason: ALTCHOICE

## 2018-01-15 RX ORDER — MOMETASONE FUROATE 1 MG/G
OINTMENT TOPICAL DAILY
Qty: 15 G | Refills: 0 | Status: SHIPPED | OUTPATIENT
Start: 2018-01-15 | End: 2018-10-30 | Stop reason: SDUPTHER

## 2018-01-15 RX ORDER — BUTALBITAL, ACETAMINOPHEN AND CAFFEINE 50; 325; 40 MG/1; MG/1; MG/1
TABLET ORAL
Qty: 30 TAB | Refills: 2 | Status: SHIPPED | OUTPATIENT
Start: 2018-01-15 | End: 2018-11-29 | Stop reason: SDUPTHER

## 2018-01-15 NOTE — PATIENT INSTRUCTIONS
Sacroiliac Pain: Exercises  Your Care Instructions  Here are some examples of typical rehabilitation exercises for your condition. Start each exercise slowly. Ease off the exercise if you start to have pain. Your doctor or physical therapist will tell you when you can start these exercises and which ones will work best for you. How to do the exercises  Knee-to-chest stretch    1. Do not do the knee-to-chest exercise if it causes or increases back or leg pain. 2. Lie on your back with your knees bent and your feet flat on the floor. You can put a small pillow under your head and neck if it is more comfortable. 3. Grasp your hands under one knee and bring the knee to your chest, keeping the other foot flat on the floor. 4. Keep your lower back pressed to the floor. Hold for at least 15 to 30 seconds. 5. Relax and lower the knee to the starting position. Repeat with the other leg. 6. Repeat 2 to 4 times with each leg. 7. To get more stretch, keep your other leg flat on the floor while pulling your knee to your chest.  Bridging    1. Lie on your back with both knees bent. Your knees should be bent about 90 degrees. 2. Tighten your belly muscles by pulling in your belly button toward your spine. Then push your feet into the floor, squeeze your buttocks, and lift your hips off the floor until your shoulders, hips, and knees are all in a straight line. 3. Hold for about 6 seconds as you continue to breathe normally, and then slowly lower your hips back down to the floor and rest for up to 10 seconds. 4. Repeat 8 to 12 times. Hip extension    1. Get down on your hands and knees on the floor. 2. Keeping your back and neck straight, lift one leg straight out behind you. When you lift your leg, keep your hips level. Don't let your back twist, and don't let your hip drop toward the floor. 3. Hold for 6 seconds. Repeat 8 to 12 times with each leg.   4. If you feel steady and strong when you do this exercise, you can make it more difficult. To do this, when you lift your leg, also lift the opposite arm straight out in front of you. For example, lift the left leg and the right arm at the same time. (This is sometimes called the \"bird dog exercise. \") Hold for 6 seconds, and repeat 8 to 12 times on each side. Clamshell    1. Lie on your side with a pillow under your head. Keep your feet and knees together and your knees bent. 2. Raise your top knee, but keep your feet together. Do not let your hips roll back. Your legs should open up like a clamshell. 3. Hold for 6 seconds. 4. Slowly lower your knee back down. Rest for 10 seconds. 5. Repeat 8 to 12 times. 6. Switch to your other side and repeat steps 1 through 5. Hamstring wall stretch    1. Lie on your back in a doorway, with one leg through the open door. 2. Slide your affected leg up the wall to straighten your knee. You should feel a gentle stretch down the back of your leg. 1. Do not arch your back. 2. Do not bend either knee. 3. Keep one heel touching the floor and the other heel touching the wall. Do not point your toes. 3. Hold the stretch for at least 1 minute to begin. Then try to lengthen the time you hold the stretch to as long as 6 minutes. 4. Switch legs, and repeat steps 1 through 3.  5. Repeat 2 to 4 times. 6. If you do not have a place to do this exercise in a doorway, there is another way to do it:  7. Lie on your back, and bend one knee. 8. Loop a towel under the ball and toes of that foot, and hold the ends of the towel in your hands. 9. Straighten your knee, and slowly pull back on the towel. You should feel a gentle stretch down the back of your leg. 10. Switch legs, and repeat steps 1 through 3.  11. Repeat 2 to 4 times. Lower abdominal strengthening    1. Lie on your back with your knees bent and your feet flat on the floor. 2. Tighten your belly muscles by pulling your belly button in toward your spine.   3. Lift one foot off the floor and bring your knee toward your chest, so that your knee is straight above your hip and your leg is bent like the letter \"L. \"  4. Lift the other knee up to the same position. 5. Lower one leg at a time to the starting position. 6. Keep alternating legs until you have lifted each leg 8 to 12 times. 7. Be sure to keep your belly muscles tight and your back still as you are moving your legs. Be sure to breathe normally. Piriformis stretch    1. Lie on your back with your legs straight. 2. Lift your affected leg, and bend your knee. With your opposite hand, reach across your body, and then gently pull your knee toward your opposite shoulder. 3. Hold the stretch for 15 to 30 seconds. 4. Switch legs and repeat steps 1 through 3.  5. Repeat 2 to 4 times. Follow-up care is a key part of your treatment and safety. Be sure to make and go to all appointments, and call your doctor if you are having problems. It's also a good idea to know your test results and keep a list of the medicines you take. Where can you learn more? Go to http://octavio-yuliana.info/. Enter H720 in the search box to learn more about \"Sacroiliac Pain: Exercises. \"  Current as of: March 21, 2017  Content Version: 11.4  © 3763-2490 Healthwise, Incorporated. Care instructions adapted under license by [employer] (which disclaims liability or warranty for this information). If you have questions about a medical condition or this instruction, always ask your healthcare professional. [address] any warranty or liability for your use of this information.

## 2018-01-15 NOTE — MR AVS SNAPSHOT
Visit Information Date & Time Provider Department Dept. Phone Encounter #  
 1/15/2018  9:00 AM Soraya Molina MD [facility] Internal Medicine [phone] [id number] Upcoming Health Maintenance Date Due DTaP/Tdap/Td series (1 - Tdap) 8/30/1989 Influenza Age 5 to Adult 8/1/2017 PAP AKA CERVICAL CYTOLOGY 11/12/2017 BREAST CANCER SCRN MAMMOGRAM 5/23/2018 Allergies as of 1/15/2018  Review Complete On: 1/15/2018 By: Soraya Molina MD  
  
 Severity Noted Reaction Type Reactions Shellfish Derived  12/23/2014    Hives, Itching, Swelling Eye swelling Sulfa (Sulfonamide Antibiotics)  12/23/2014    Other (comments) Abdominal pain Current Immunizations  Never Reviewed Name Date Influenza Vaccine 11/1/2014 Not reviewed this visit You Were Diagnosed With   
  
 Codes Comments Right buttock pain    -  Primary ICD-10-CM: M79.1 ICD-9-CM: 729.1 Migraine without aura and without status migrainosus, not intractable     ICD-10-CM: C32.324 ICD-9-CM: 346.10 Intrinsic eczema     ICD-10-CM: L20.84 ICD-9-CM: 691.8 Vitals BP Pulse Temp Resp Height(growth percentile) Weight(growth percentile) 110/80 (BP 1 Location: Right arm, BP Patient Position: Sitting) 83 98.5 °F (36.9 °C) (Oral) 19 5' 2\" (1.575 m) 175 lb 9.6 oz (79.7 kg) SpO2 BMI OB Status Smoking Status 98% 32.12 kg/m2 Premenopausal Never Smoker Vitals History BMI and BSA Data Body Mass Index Body Surface Area  
 32.12 kg/m 2 1.87 m 2 Preferred Pharmacy Pharmacy Name Phone [facility]/PHARMACY #[id number], [place] - [address] [phone] Your Updated Medication List  
  
   
This list is accurate as of: 1/15/18  9:50 AM.  Always use your most recent med list.  
  
  
  
  
 BENADRYL 25 mg capsule Generic drug:  diphenhydrAMINE Take 25 mg by mouth every six (6) hours as needed. butalbital-acetaminophen-caffeine -40 mg per tablet Commonly known as:  FIORICET, ESGIC  
TAKE 1 TABLET EVERY 4 HOURS AS NEEDED FOR HEADACHE  
  
 diclofenac EC 75 mg EC tablet Commonly known as:  VOLTAREN Take 1 Tab by mouth two (2) times a day. diclofenac potassium 50 mg tablet Commonly known as:  CATAFLAM  
Take 50 mg by mouth three (3) times daily. * ibuprofen 800 mg tablet Commonly known as:  MOTRIN Take 1 Tab by mouth every eight (8) hours as needed for Pain. * ibuprofen 800 mg tablet Commonly known as:  MOTRIN  
TAKE 1 TABLET BY MOUTH EVERY 8 HOURS AS NEEDED FOR PAIN  
  
 melatonin Tab tablet Take  by mouth nightly. methylPREDNISolone 4 mg tablet Commonly known as:  Quinton Come Take 1 Tab by mouth Specific Days and Specific Times. mometasone 0.1 % ointment Commonly known as:  Justine Oquendo Apply  to affected area daily. multivitamin tablet Commonly known as:  ONE A DAY Take 1 tablet by mouth daily. suvorexant 5 mg tablet Commonly known as:  Prudence Ro Take 1 Tab by mouth nightly as needed for Insomnia. Max Daily Amount: 5 mg.  
  
 tolterodine ER 2 mg ER capsule Commonly known as:  DETROL-LA Take 2 Caps by mouth nightly. * Notice: This list has 2 medication(s) that are the same as other medications prescribed for you. Read the directions carefully, and ask your doctor or other care provider to review them with you. Prescriptions Sent to Pharmacy Refills  
 butalbital-acetaminophen-caffeine (FIORICET, ESGIC) -40 mg per tablet 2 Sig: TAKE 1 TABLET EVERY 4 HOURS AS NEEDED FOR HEADACHE Class: Normal  
 Pharmacy: [facility]/pharmacy [address] [place] [address] [address] Ph #: [phone]  
 mometasone (ELOCON) 0.1 % ointment 0 Sig: Apply  to affected area daily. Class: Normal  
 Pharmacy: [facility]/pharmacy [address] [place], [place] [address] [place] [address] Ph #: [phone] Route: Topical  
 methylPREDNISolone (MEDROL DOSEPACK) 4 mg tablet 0 Sig: Take 1 Tab by mouth Specific Days and Specific Times. Class: Normal  
 Pharmacy: [facility]/pharmacy [address] [place] [address] [address] Ph #: [phone] Route: Oral  
 diclofenac EC (VOLTAREN) 75 mg EC tablet 0 Sig: Take 1 Tab by mouth two (2) times a day. Class: Normal  
 Pharmacy: [facility] [id number] [place] [address] [address] Ph #: [phone] Route: Oral  
  
Patient Instructions Sacroiliac Pain: Exercises Your Care Instructions Here are some examples of typical rehabilitation exercises for your condition. Start each exercise slowly. Ease off the exercise if you start to have pain. Your doctor or physical therapist will tell you when you can start these exercises and which ones will work best for you. How to do the exercises Knee-to-chest stretch 1. Do not do the knee-to-chest exercise if it causes or increases back or leg pain. 2. Lie on your back with your knees bent and your feet flat on the floor. You can put a small pillow under your head and neck if it is more comfortable. 3. Grasp your hands under one knee and bring the knee to your chest, keeping the other foot flat on the floor. 4. Keep your lower back pressed to the floor. Hold for at least 15 to 30 seconds. 5. Relax and lower the knee to the starting position. Repeat with the other leg. 6. Repeat 2 to 4 times with each leg. 7. To get more stretch, keep your other leg flat on the floor while pulling your knee to your chest. 
Bridging 1. Lie on your back with both knees bent. Your knees should be bent about 90 degrees. 2. Tighten your belly muscles by pulling in your belly button toward your spine.  Then push your feet into the floor, squeeze your buttocks, and lift your hips off the floor until your shoulders, hips, and knees are all in a straight line. 3. Hold for about 6 seconds as you continue to breathe normally, and then slowly lower your hips back down to the floor and rest for up to 10 seconds. 4. Repeat 8 to 12 times. Hip extension 1. Get down on your hands and knees on the floor. 2. Keeping your back and neck straight, lift one leg straight out behind you. When you lift your leg, keep your hips level. Don't let your back twist, and don't let your hip drop toward the floor. 3. Hold for 6 seconds. Repeat 8 to 12 times with each leg. 4. If you feel steady and strong when you do this exercise, you can make it more difficult. To do this, when you lift your leg, also lift the opposite arm straight out in front of you. For example, lift the left leg and the right arm at the same time. (This is sometimes called the \"bird dog exercise. \") Hold for 6 seconds, and repeat 8 to 12 times on each side. Clamshell 1. Lie on your side with a pillow under your head. Keep your feet and knees together and your knees bent. 2. Raise your top knee, but keep your feet together. Do not let your hips roll back. Your legs should open up like a clamshell. 3. Hold for 6 seconds. 4. Slowly lower your knee back down. Rest for 10 seconds. 5. Repeat 8 to 12 times. 6. Switch to your other side and repeat steps 1 through 5. Hamstring wall stretch 1. Lie on your back in a doorway, with one leg through the open door. 2. Slide your affected leg up the wall to straighten your knee. You should feel a gentle stretch down the back of your leg. 1. Do not arch your back. 2. Do not bend either knee. 3. Keep one heel touching the floor and the other heel touching the wall. Do not point your toes. 3. Hold the stretch for at least 1 minute to begin. Then try to lengthen the time you hold the stretch to as long as 6 minutes. 4. Switch legs, and repeat steps 1 through 3. 5. Repeat 2 to 4 times. 6. If you do not have a place to do this exercise in a doorway, there is another way to do it: 
7. Lie on your back, and bend one knee. 8. Loop a towel under the ball and toes of that foot, and hold the ends of the towel in your hands. 9. Straighten your knee, and slowly pull back on the towel. You should feel a gentle stretch down the back of your leg. 10. Switch legs, and repeat steps 1 through 3. 
11. Repeat 2 to 4 times. Lower abdominal strengthening 1. Lie on your back with your knees bent and your feet flat on the floor. 2. Tighten your belly muscles by pulling your belly button in toward your spine. 3. Lift one foot off the floor and bring your knee toward your chest, so that your knee is straight above your hip and your leg is bent like the letter \"L. \" 
4. Lift the other knee up to the same position. 5. Lower one leg at a time to the starting position. 6. Keep alternating legs until you have lifted each leg 8 to 12 times. 7. Be sure to keep your belly muscles tight and your back still as you are moving your legs. Be sure to breathe normally. Piriformis stretch 1. Lie on your back with your legs straight. 2. Lift your affected leg, and bend your knee. With your opposite hand, reach across your body, and then gently pull your knee toward your opposite shoulder. 3. Hold the stretch for 15 to 30 seconds. 4. Switch legs and repeat steps 1 through 3. 
5. Repeat 2 to 4 times. Follow-up care is a key part of your treatment and safety. Be sure to make and go to all appointments, and call your doctor if you are having problems. It's also a good idea to know your test results and keep a list of the medicines you take. Where can you learn more? Go to http://octavio-yuliana.info/. Enter P067 in the search box to learn more about \"Sacroiliac Pain: Exercises. \" Current as of: March 21, 2017 Content Version: 11.4 © 1018-0980 Healthwise, Incorporated. Care instructions adapted under license by [employer] (which disclaims liability or warranty for this information). If you have questions about a medical condition or this instruction, always ask your healthcare professional. [address] any warranty or liability for your use of this information. Introducing [facility]! [facility] introduces DoutÃ­ssima patient portal. Now you can access parts of your medical record, email your doctor's office, and request medication refills online. 1. In your internet browser, go to [email or web address] 2. Click on the First Time User? Click Here link in the Sign In box. You will see the New Member Sign Up page. 3. Enter your DoutÃ­ssima Access Code exactly as it appears below. You will not need to use this code after youve completed the sign-up process. If you do not sign up before the expiration date, you must request a new code. · DoutÃ­ssima Access Code: [email or web address] Expires: 2/14/2018  9:03 AM 
 
4. Enter the last four digits of your Social Security Number (xxxx) and Date of Birth (mm/dd/yyyy) as indicated and click Submit. You will be taken to the next sign-up page. 5. Create a DoutÃ­ssima ID. This will be your DoutÃ­ssima login ID and cannot be changed, so think of one that is secure and easy to remember. 6. Create a DoutÃ­ssima password. You can change your password at any time. 7. Enter your Password Reset Question and Answer. This can be used at a later time if you forget your password. 8. Enter your e-mail address. You will receive e-mail notification when new information is available in [address] 9. Click Sign Up. You can now view and download portions of your medical record. 10. Click the Download Summary menu link to download a portable copy of your medical information.  
 
If you have questions, please visit the Frequently Asked Questions section of the ECO. Remember, [employer] is NOT to be used for urgent needs. For medical emergencies, dial 911. Now available from your iPhone and Android! Please provide this summary of care documentation to your next provider. Your primary care clinician is listed as Sahil Ambrose. If you have any questions after today's visit, please call [phone].

## 2018-01-15 NOTE — PROGRESS NOTES
Acute Care Note    Lata Quarles is 50 y.o. female. she presents for evaluation of Hip Injury    Hip Pain  Patient complains of right hip pain. Onset of the symptoms was several weeks ago. Inciting event: fell while dancing. Current symptoms include right buttock pain. Severity = moderate. Associated symptoms: none. Aggravating symptoms: standing, squatting. Patient's overall course: stable. Patient has had no prior hip problems. Previous visits for this problem: none. Evaluation to date: none. Treatment to date: OTC analgesics PRN: not very effective. She also has a history of chronic migraine. Asking about medication for this. She has some eczema present on her chest.  Asking for a cream to help with this. Prior to Admission medications    Medication Sig Start Date End Date Taking? Authorizing Provider   diphenhydrAMINE (BENADRYL) 25 mg capsule Take 25 mg by mouth every six (6) hours as needed. Yes Historical Provider   diclofenac potassium (CATAFLAM) 50 mg tablet Take 50 mg by mouth three (3) times daily. Yes Historical Provider   tolterodine ER (DETROL-LA) 2 mg ER capsule Take 2 Caps by mouth nightly. 11/16/17  Yes Patricio Simons MD   ibuprofen (MOTRIN) 800 mg tablet TAKE 1 TABLET BY MOUTH EVERY 8 HOURS AS NEEDED FOR PAIN 8/7/17  Yes Patricio Simons MD   butalbital-acetaminophen-caffeine (FIORICET, ESGIC) -40 mg per tablet TAKE 1 TABLET EVERY 4 HOURS AS NEEDED FOR HEADACHE 3/14/17  Yes Patricio Simons MD   ibuprofen (MOTRIN) 800 mg tablet Take 1 Tab by mouth every eight (8) hours as needed for Pain. 8/22/16  Yes Patricio Simons MD   melatonin tab tablet Take  by mouth nightly. Yes Historical Provider   multivitamin (ONE A DAY) tablet Take 1 tablet by mouth daily. Yes Historical Provider   suvorexant (BELSOMRA) 5 mg tablet Take 1 Tab by mouth nightly as needed for Insomnia.  Max Daily Amount: 5 mg. 8/7/17   Patricio Simons MD         Patient Active Problem List Diagnosis Code    Depression F32.9    Migraines G43.909    HTN (hypertension) I10    Obesity E66.9         Review of Systems   Constitutional: Negative. Respiratory: Negative. Cardiovascular: Negative. Gastrointestinal: Negative. Musculoskeletal: Positive for joint pain. Visit Vitals    /80 (BP 1 Location: Right arm, BP Patient Position: Sitting)    Pulse 83    Temp 98.5 °F (36.9 °C) (Oral)    Resp 19    Ht 5' 2\" (1.575 m)    Wt 175 lb 9.6 oz (79.7 kg)    SpO2 98%    BMI 32.12 kg/m2       Physical Exam   Constitutional: No distress. Cardiovascular: Normal rate and regular rhythm. Pulmonary/Chest: Effort normal and breath sounds normal.   Musculoskeletal:        Right hip: She exhibits decreased range of motion and tenderness. Legs:          ASSESSMENT/PLAN  Diagnoses and all orders for this visit:    1. Right buttock pain  -     methylPREDNISolone (MEDROL DOSEPACK) 4 mg tablet; Take 1 Tab by mouth Specific Days and Specific Times.  -     diclofenac EC (VOLTAREN) 75 mg EC tablet; Take 1 Tab by mouth two (2) times a day. 2. Migraine without aura and without status migrainosus, not intractable  -     butalbital-acetaminophen-caffeine (FIORICET, ESGIC) -40 mg per tablet; TAKE 1 TABLET EVERY 4 HOURS AS NEEDED FOR HEADACHE    3. Intrinsic eczema  -     mometasone (ELOCON) 0.1 % ointment; Apply  to affected area daily. Advised the patient to call back or return to office if symptoms worsen/change/persist.   Discussed expected course/resolution/complications of diagnosis in detail with patient. Medication risks/benefits/costs/interactions/alternatives discussed with patient. The patient was given an after visit summary which includes diagnoses, current medications, & vitals. They expressed understanding with the diagnosis and plan.

## 2018-01-29 ENCOUNTER — TELEPHONE (OUTPATIENT)
Dept: INTERNAL MEDICINE CLINIC | Age: 48
End: 2018-01-29

## 2018-01-29 DIAGNOSIS — M25.551 PAIN OF RIGHT HIP JOINT: Primary | ICD-10-CM

## 2018-01-29 NOTE — TELEPHONE ENCOUNTER
Patient states came in few weeks ago for hip pain, received medrol pack and Voltaren (do not take every day). Hip pain bother when sitting to standing. No x-ray done. Patient would like to know how to proceed and if xray is needed. Informed will forward and call back. Pt verbalized understanding.

## 2018-01-29 NOTE — PROGRESS NOTES
Informed patient xray of right hip ordered. Pt would like to have xray at [facility]. Patient will confirm if can fax to imaging center or if need to come by office to  order.

## 2018-01-31 ENCOUNTER — DOCUMENTATION ONLY (OUTPATIENT)
Dept: INTERNAL MEDICINE CLINIC | Age: 48
End: 2018-01-31

## 2018-01-31 ENCOUNTER — TELEPHONE (OUTPATIENT)
Dept: INTERNAL MEDICINE CLINIC | Age: 48
End: 2018-01-31

## 2018-02-01 ENCOUNTER — TELEPHONE (OUTPATIENT)
Dept: INTERNAL MEDICINE CLINIC | Age: 48
End: 2018-02-01

## 2018-02-02 NOTE — TELEPHONE ENCOUNTER
Spoke with patient, [place] radi[facility] requesting reason for xray. Informed patient to rule out bone injury. Informed faxed order with indication. Pt states will contact radiology department to confirm. Pt verbalized understanding.

## 2018-02-05 ENCOUNTER — TELEPHONE (OUTPATIENT)
Dept: INTERNAL MEDICINE CLINIC | Age: 48
End: 2018-02-05

## 2018-02-05 DIAGNOSIS — M25.551 RIGHT HIP PAIN: Primary | ICD-10-CM
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
## 2018-02-05 NOTE — TELEPHONE ENCOUNTER
Nabil Smiley ([facility]) [phone]     Pt calling regarding x-ray order and how it should be written.

## 2018-02-10 DIAGNOSIS — M79.18 RIGHT BUTTOCK PAIN: ICD-10-CM

## 2018-02-12 RX ORDER — DICLOFENAC SODIUM 75 MG/1
TABLET, DELAYED RELEASE ORAL
Qty: 30 TAB | Refills: 0 | Status: SHIPPED | OUTPATIENT
Start: 2018-02-12 | End: 2018-03-23 | Stop reason: SDUPTHER

## 2018-02-20 ENCOUNTER — TELEPHONE (OUTPATIENT)
Dept: INTERNAL MEDICINE CLINIC | Age: 48
End: 2018-02-20

## 2018-02-20 NOTE — TELEPHONE ENCOUNTER
Patient has been informed with XR results and recs' patient verbalizes understanding. She sates will not need any PT orders at this time and prefers not to have any joint injections.

## 2018-02-23 DIAGNOSIS — M25.551 RIGHT HIP PAIN: ICD-10-CM

## 2018-02-23 DIAGNOSIS — M25.551 PAIN OF RIGHT HIP JOINT: ICD-10-CM
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
## 2018-02-28 ENCOUNTER — TELEPHONE (OUTPATIENT)
Dept: INTERNAL MEDICINE CLINIC | Age: 48
End: 2018-02-28

## 2018-02-28 DIAGNOSIS — R11.0 NAUSEA: Primary | ICD-10-CM

## 2018-02-28 RX ORDER — ONDANSETRON 8 MG/1
8 TABLET, ORALLY DISINTEGRATING ORAL
Qty: 30 TAB | Refills: 0 | Status: SHIPPED | OUTPATIENT
Start: 2018-02-28 | End: 2019-08-28 | Stop reason: SDUPTHER

## 2018-02-28 NOTE — TELEPHONE ENCOUNTER
Asher Ochoa (Self) [phone]     Pt says that she is going out of town tomorrow and has an upset stomach, she is requesting a rx for zofran be sent to pharm for her.

## 2018-02-28 NOTE — TELEPHONE ENCOUNTER
Patient stated she is going out of town and is experiencing a bit of nausea along with her cycle related migraines. Lindsay Zhao has helped her with the headache. Requests something for nausea to help, denies any other GI issues at this time.  Please advise

## 2018-03-23 ENCOUNTER — OFFICE VISIT (OUTPATIENT)
Dept: INTERNAL MEDICINE CLINIC | Age: 48
End: 2018-03-23

## 2018-03-23 VITALS
BODY MASS INDEX: 31.43 KG/M2 | OXYGEN SATURATION: 98 % | TEMPERATURE: 98.1 F | WEIGHT: 170.8 LBS | SYSTOLIC BLOOD PRESSURE: 120 MMHG | HEIGHT: 62 IN | DIASTOLIC BLOOD PRESSURE: 80 MMHG | RESPIRATION RATE: 17 BRPM | HEART RATE: 88 BPM

## 2018-03-23 DIAGNOSIS — R05.8 RECURRENT COUGH: Primary | ICD-10-CM

## 2018-03-23 DIAGNOSIS — M79.18 RIGHT BUTTOCK PAIN: ICD-10-CM

## 2018-03-23 DIAGNOSIS — Z13.89 SCREENING FOR BLOOD OR PROTEIN IN URINE: ICD-10-CM

## 2018-03-23 LAB
BILIRUB UR QL STRIP: NEGATIVE
GLUCOSE UR-MCNC: NEGATIVE MG/DL
KETONES P FAST UR STRIP-MCNC: NEGATIVE MG/DL
PH UR STRIP: 7.5 [PH] (ref 4.6–8)
PROT UR QL STRIP: NEGATIVE
SP GR UR STRIP: 1.02 (ref 1–1.03)
UA UROBILINOGEN AMB POC: NORMAL (ref 0.2–1)
URINALYSIS CLARITY POC: CLEAR
URINALYSIS COLOR POC: YELLOW
URINE BLOOD POC: NEGATIVE
URINE LEUKOCYTES POC: NORMAL
URINE NITRITES POC: NEGATIVE

## 2018-03-23 RX ORDER — METHYLPREDNISOLONE 4 MG/1
4 TABLET ORAL
Qty: 1 DOSE PACK | Refills: 0 | Status: SHIPPED | OUTPATIENT
Start: 2018-03-23 | End: 2018-10-30

## 2018-03-23 NOTE — PATIENT INSTRUCTIONS

## 2018-03-23 NOTE — PROGRESS NOTES
Acute Care Note    Meagan Quiñones is 50 y.o. female. she presents for evaluation of Cough and Blood in [facility] is here to talk about a persistent cough. This is a new problem problem. Symptoms began a few weeks ago and it has waxed and waned since that time. The cough is non-productive, paroxysmal and is aggravated by nothing. Associated symptoms include:chest pain which appears to be muscular in nature. She denies: SOB, fever, chills. She has tried over-the-counter cough remedies, and these have been not very effective. She has reported blood in her urine as well. Urinalysis was checked today in the office and was negative for blood. Prior to Admission medications    Medication Sig Start Date End Date Taking? Authorizing Provider   ondansetron (ZOFRAN ODT) 8 mg disintegrating tablet Take 1 Tab by mouth every six (6) hours as needed for Nausea. 2/28/18  Yes Alexander Salter MD   diclofenac EC (VOLTAREN) 75 mg EC tablet TAKE 1 TAB BY MOUTH TWO (2) TIMES A DAY. 2/12/18  Yes Alexander Salter MD   tolterodine ER (DETROL-LA) 2 mg ER capsule TAKE ONE CAPSULE EVERY DAY 1/15/18  Yes Alexander Salter MD   diphenhydrAMINE (BENADRYL) 25 mg capsule Take 25 mg by mouth every six (6) hours as needed. Yes Historical Provider   butalbital-acetaminophen-caffeine (FIORICET, ESGIC) -40 mg per tablet TAKE 1 TABLET EVERY 4 HOURS AS NEEDED FOR HEADACHE 1/15/18  Yes Alexander Salter MD   melatonin tab tablet Take  by mouth nightly. Yes Historical Provider   multivitamin (ONE A DAY) tablet Take 1 tablet by mouth daily. Yes Historical Provider   mometasone (ELOCON) 0.1 % ointment Apply  to affected area daily. 1/15/18   Alexander Salter MD   suvorexant (BELSOMRA) 5 mg tablet Take 1 Tab by mouth nightly as needed for Insomnia.  Max Daily Amount: 5 mg. 8/7/17   Alexander Salter MD         Patient Active Problem List   Diagnosis Code    Depression F32.9    Migraines N26.550    HTN (hypertension) I10    Obesity E66.9         Review of Systems   Constitutional: Negative. Eyes: Negative. Respiratory: Positive for cough. Negative for shortness of breath and wheezing. Cardiovascular: Negative. Genitourinary: Positive for hematuria. Visit Vitals    /80 (BP 1 Location: Right arm, BP Patient Position: Sitting)    Pulse 88    Temp 98.1 °F (36.7 °C) (Oral)    Resp 17    Ht 5' 2\" (1.575 m)    Wt 170 lb 12.8 oz (77.5 kg)    SpO2 98%    BMI 31.24 kg/m2       Physical Exam   Constitutional: No distress. HENT:   Mouth/Throat: Oropharynx is clear and moist.   Cardiovascular: Normal rate and regular rhythm. Pulmonary/Chest: Effort normal and breath sounds normal. She has no wheezes. Results for orders placed or performed in visit on 03/23/18   AMB POC URINALYSIS DIP STICK AUTO W/O MICRO     Status: None   Result Value Ref Range Status    Color (UA POC) Yellow  Final    Clarity (UA POC) Clear  Final    Glucose (UA POC) Negative Negative Final    Bilirubin (UA POC) Negative Negative Final    Ketones (UA POC) Negative Negative Final    Specific gravity (UA POC) 1.020 1.001 - 1.035 Final    Blood (UA POC) Negative Negative Final    pH (UA POC) 7.5 4.6 - 8.0 Final    Protein (UA POC) Negative Negative Final    Urobilinogen (UA POC) 0.2 mg/dL 0.2 - 1 Final    Nitrites (UA POC) Negative Negative Final    Leukocyte esterase (UA POC) Trace Negative Final         ASSESSMENT/PLAN  Diagnoses and all orders for this visit:    1. Recurrent cough  -     methylPREDNISolone (MEDROL DOSEPACK) 4 mg tablet; Take 1 Tab by mouth Specific Days and Specific Times. 2. Screening for blood or protein in urine  -     AMB POC URINALYSIS DIP STICK AUTO W/O MICRO         Advised the patient to call back or return to office if symptoms worsen/change/persist.   Discussed expected course/resolution/complications of diagnosis in detail with patient.      Medication risks/benefits/costs/interactions/alternatives discussed with patient. The patient was given an after visit summary which includes diagnoses, current medications, & vitals. They expressed understanding with the diagnosis and plan.

## 2018-03-27 RX ORDER — DICLOFENAC SODIUM 75 MG/1
TABLET, DELAYED RELEASE ORAL
Qty: 30 TAB | Refills: 2 | Status: SHIPPED | OUTPATIENT
Start: 2018-03-27 | End: 2018-04-04 | Stop reason: SDUPTHER

## 2018-03-30 NOTE — MR AVS SNAPSHOT
[address] [address] 
[phone] Patient: Tasha Stein MRN: [id number] AJX:[id number] Visit Information Date & Time Provider Department Dept. Phone Encounter #  
 3/23/2018 11:00 AM Luz Maria Rosales MD [facility] Internal Medicine [phone] [id number] Follow-up Instructions Return if symptoms worsen or fail to improve. Upcoming Health Maintenance Date Due DTaP/Tdap/Td series (1 - Tdap) 8/30/1989 Influenza Age 5 to Adult 8/1/2017 PAP AKA CERVICAL CYTOLOGY 11/12/2017 BREAST CANCER SCRN MAMMOGRAM 5/23/2018 Allergies as of 3/23/2018  Review Complete On: 3/23/2018 By: Luz Maria Rosales MD  
  
 Severity Noted Reaction Type Reactions Shellfish Derived  12/23/2014    Hives, Itching, Swelling Eye swelling Sulfa (Sulfonamide Antibiotics)  12/23/2014    Other (comments) Abdominal pain Current Immunizations  Never Reviewed Name Date Influenza Vaccine 11/1/2014 Not reviewed this visit You Were Diagnosed With   
  
 Codes Comments Recurrent cough    -  Primary ICD-10-CM: O17 ICD-9-CM: 786.2 Screening for blood or protein in urine     ICD-10-CM: Z13.89 ICD-9-CM: V82.9 Vitals BP Pulse Temp Resp Height(growth percentile) Weight(growth percentile) 120/80 (BP 1 Location: Right arm, BP Patient Position: Sitting) 88 98.1 °F (36.7 °C) (Oral) 17 5' 2\" (1.575 m) 170 lb 12.8 oz (77.5 kg) SpO2 BMI OB Status Smoking Status 98% 31.24 kg/m2 Premenopausal Never Smoker Vitals History BMI and BSA Data Body Mass Index Body Surface Area  
 31.24 kg/m 2 1.84 m 2 Preferred Pharmacy Pharmacy Name Phone [facility]/PHARMACY #[id number], [place] - [address] [phone] Your Updated Medication List  
  
   
This list is accurate as of 3/23/18 11:43 AM.  Always use your most recent med list.  
  
  
  
  
 BENADRYL 25 mg capsule Generic drug:  diphenhydrAMINE Take 25 mg by mouth every six (6) hours as needed. butalbital-acetaminophen-caffeine -40 mg per tablet Commonly known as:  FIORICET, ESGIC  
TAKE 1 TABLET EVERY 4 HOURS AS NEEDED FOR HEADACHE  
  
 diclofenac EC 75 mg EC tablet Commonly known as:  VOLTAREN  
TAKE 1 TAB BY MOUTH TWO (2) TIMES A DAY. melatonin Tab tablet Take  by mouth nightly. methylPREDNISolone 4 mg tablet Commonly known as:  Pamela Schwalbe Take 1 Tab by mouth Specific Days and Specific Times. mometasone 0.1 % ointment Commonly known as:  [place] G[place] Apply  to affected area daily. multivitamin tablet Commonly known as:  ONE A DAY Take 1 tablet by mouth daily. ondansetron 8 mg disintegrating tablet Commonly known as:  ZOFRAN ODT Take 1 Tab by mouth every six (6) hours as needed for Nausea. suvorexant 5 mg tablet Commonly known as:  Dagoberto Altamirano Take 1 Tab by mouth nightly as needed for Insomnia. Max Daily Amount: 5 mg.  
  
 tolterodine ER 2 mg ER capsule Commonly known as:  DETROL-LA  
TAKE ONE CAPSULE EVERY DAY Prescriptions Sent to Pharmacy Refills  
 methylPREDNISolone (MEDROL DOSEPACK) 4 mg tablet 0 Sig: Take 1 Tab by mouth Specific Days and Specific Times. Class: Normal  
 Pharmacy: [facility]/pharmacy [address] [place] [address] S[address]  #: [phone] Route: Oral  
  
We Performed the Following AMB POC URINALYSIS DIP STICK AUTO W/O MICRO [25624 CPT(R)] Follow-up Instructions Return if symptoms worsen or fail to improve. Patient Instructions Cough: Care Instructions Your Care Instructions A cough is your body's response to something that bothers your throat or airways. Many things can cause a cough.  You might cough because of a cold or the flu, bronchitis, or asthma. Smoking, postnasal drip, allergies, and stomach acid that backs up into your throat also can cause coughs. A cough is a symptom, not a disease. Most coughs stop when the cause, such as a cold, goes away. You can take a few steps at home to cough less and feel better. Follow-up care is a key part of your treatment and safety. Be sure to make and go to all appointments, and call your doctor if you are having problems. It's also a good idea to know your test results and keep a list of the medicines you take. How can you care for yourself at home? · Drink lots of water and other fluids. This helps thin the mucus and soothes a dry or sore throat. Honey or lemon juice in hot water or tea may ease a dry cough. · Take cough medicine as directed by your doctor. · Prop up your head on pillows to help you breathe and ease a dry cough. · Try cough drops to soothe a dry or sore throat. Cough drops don't stop a cough. Medicine-flavored cough drops are no better than candy-flavored drops or hard candy. · Do not smoke. Avoid secondhand smoke. If you need help quitting, talk to your doctor about stop-smoking programs and medicines. These can increase your chances of quitting for good. When should you call for help? Call 911 anytime you think you may need emergency care. For example, call if: 
? · You have severe trouble breathing. ?Call your doctor now or seek immediate medical care if: 
? · You cough up blood. ? · You have new or worse trouble breathing. ? · You have a new or higher fever. ? · You have a new rash. ? Watch closely for changes in your health, and be sure to contact your doctor if: 
? · You cough more deeply or more often, especially if you notice more mucus or a change in the color of your mucus. ? · You have new symptoms, such as a sore throat, an earache, or sinus pain. ? · You do not get better as expected. Where can you learn more? Go to http://octavio-yuliana.info/. Enter D279 in the search box to learn more about \"Cough: Care Instructions. \" Current as of: May 12, 2017 Content Version: 11.4 © 1884-5072 [employer]. Care instructions adapted under license by [employer] (which disclaims liability or warranty for this information). If you have questions about a medical condition or this instruction, always ask your healthcare professional. [address] [place] [address] any warranty or liability for your use of this information. Introducing hospitals & HEALTH SERVICES! [facility] introduces [employer] patient portal. Now you can access parts of your medical record, email your doctor's office, and request medication refills online. 1. In your internet browser, go to [email or web address] 2. Click on the First Time User? Click Here link in the Sign In box. You will see the New Member Sign Up page. 3. Enter your [employer] Access Code exactly as it appears below. You will not need to use this code after youve completed the sign-up process. If you do not sign up before the expiration date, you must request a new code. · [employer] Access Code: [email or web address] Expires: 6/21/2018 11:03 AM 
 
4. Enter the last four digits of your Social Security Number (xxxx) and Date of Birth (mm/dd/yyyy) as indicated and click Submit. You will be taken to the next sign-up page. 5. Create a [employer] ID. This will be your [employer] login ID and cannot be changed, so think of one that is secure and easy to remember. 6. Create a [employer] password. You can change your password at any time. 7. Enter your Password Reset Question and Answer. This can be used at a later time if you forget your password. 8. Enter your e-mail address. You will receive e-mail notification when new information is available in [address] 9. Click Sign Up. You can now view and download portions of your medical record. 10. Click the Download Summary menu link to download a portable copy of your medical information. If you have questions, please visit the Frequently Asked Questions section of the [employer] website. Remember, [employer] is NOT to be used for urgent needs. For medical emergencies, dial 911. Now available from your iPhone and Android! Please provide this summary of care documentation to your next provider. Your primary care clinician is listed as Dunia Dent. If you have any questions after today's visit, please call [phone]. 29-Mar-2018 19:00

## 2018-04-04 DIAGNOSIS — M79.18 RIGHT BUTTOCK PAIN: ICD-10-CM

## 2018-04-04 RX ORDER — DICLOFENAC SODIUM 75 MG/1
TABLET, DELAYED RELEASE ORAL
Qty: 180 TAB | Refills: 0 | Status: SHIPPED | OUTPATIENT
Start: 2018-04-04 | End: 2018-09-24 | Stop reason: SDUPTHER

## 2018-08-27 ENCOUNTER — OFFICE VISIT (OUTPATIENT)
Dept: INTERNAL MEDICINE CLINIC | Age: 48
End: 2018-08-27

## 2018-08-27 VITALS
HEART RATE: 87 BPM | HEIGHT: 62 IN | BODY MASS INDEX: 30.91 KG/M2 | TEMPERATURE: 98.7 F | WEIGHT: 168 LBS | OXYGEN SATURATION: 98 % | SYSTOLIC BLOOD PRESSURE: 122 MMHG | RESPIRATION RATE: 16 BRPM | DIASTOLIC BLOOD PRESSURE: 72 MMHG

## 2018-08-27 DIAGNOSIS — M54.50 LUMBAR PAIN: Primary | ICD-10-CM

## 2018-08-27 RX ORDER — CETIRIZINE HCL 10 MG
TABLET ORAL
COMMUNITY
End: 2018-10-30 | Stop reason: SDUPTHER

## 2018-08-27 NOTE — MR AVS SNAPSHOT
[address] [address] 
[phone] Patient: Chantal Vasquez MRN: [id number] Visit Information Date & Time Provider Department Dept. Phone Encounter #  
 8/27/2018  7:45 AM Nerissa Castro MD [facility] Internal Medicine [phone] [id number] Follow-up Instructions Return if symptoms worsen or fail to improve. Follow-up and Disposition History Upcoming Health Maintenance Date Due DTaP/Tdap/Td series (1 - Tdap) 8/30/1989 PAP AKA CERVICAL CYTOLOGY 11/12/2017 BREAST CANCER SCRN MAMMOGRAM 5/23/2018 Influenza Age 5 to Adult 8/1/2018 FOBT Q 1 YEAR AGE 50-75 8/30/2018 Allergies as of 8/27/2018  Review Complete On: 3/23/2018 By: Nerissa Castro MD  
  
 Severity Noted Reaction Type Reactions Shellfish Derived  12/23/2014    Hives, Itching, Swelling Eye swelling Sulfa (Sulfonamide Antibiotics)  12/23/2014    Other (comments) Abdominal pain Current Immunizations  Never Reviewed Name Date Influenza Vaccine 11/1/2014 Not reviewed this visit You Were Diagnosed With   
  
 Codes Comments Lumbar pain    -  Primary ICD-10-CM: M54.5 ICD-9-CM: 724.2 Vitals BP Pulse Temp Resp Height(growth percentile) Weight(growth percentile) 122/72 (BP 1 Location: Right arm, BP Patient Position: Sitting) 87 98.7 °F (37.1 °C) (Oral) 16 5' 2\" (1.575 m) 168 lb (76.2 kg) SpO2 BMI OB Status Smoking Status 98% 30.73 kg/m2 Premenopausal Never Smoker Vitals History BMI and BSA Data Body Mass Index Body Surface Area 30.73 kg/m 2 1.83 m 2 Preferred Pharmacy Pharmacy Name Phone [id number]- [place], [place] - [address] [phone] Your Updated Medication List  
  
   
This list is accurate as of 8/27/18 11:59 PM.  Always use your most recent med list.  
  
  
  
  
 BENADRYL 25 mg capsule Generic drug:  diphenhydrAMINE Take 25 mg by mouth every six (6) hours as needed. butalbital-acetaminophen-caffeine -40 mg per tablet Commonly known as:  FIORICET, ESGIC  
TAKE 1 TABLET EVERY 4 HOURS AS NEEDED FOR HEADACHE  
  
 cetirizine 10 mg tablet Commonly known as:  ZYRTEC Take  by mouth. diclofenac EC 75 mg EC tablet Commonly known as:  VOLTAREN  
TAKE 1 TAB BY MOUTH TWO (2) TIMES A DAY. melatonin Tab tablet Take  by mouth nightly. methylPREDNISolone 4 mg tablet Commonly known as:  Judithe Kate Take 1 Tab by mouth Specific Days and Specific Times. mometasone 0.1 % ointment Commonly known as:  [place] Apply  to affected area daily. multivitamin tablet Commonly known as:  ONE A DAY Take 1 tablet by mouth daily. ondansetron 8 mg disintegrating tablet Commonly known as:  ZOFRAN ODT Take 1 Tab by mouth every six (6) hours as needed for Nausea. suvorexant 5 mg tablet Commonly known as:  [place]onia L[place] Take 1 Tab by mouth nightly as needed for Insomnia. Max Daily Amount: 5 mg.  
  
 tolterodine ER 2 mg ER capsule Commonly known as:  DETROL-LA  
TAKE ONE CAPSULE EVERY DAY Follow-up Instructions Return if symptoms worsen or fail to improve. To-Do List   
 09/07/2018 Imaging:  XR SPINE LUMB 2 OR 3 V Patient Instructions Low Back Pain: Exercises Your Care Instructions Here are some examples of typical rehabilitation exercises for your condition. Start each exercise slowly. Ease off the exercise if you start to have pain. Your doctor or physical therapist will tell you when you can start these exercises and which ones will work best for you. How to do the exercises Press-up 1. Lie on your stomach, supporting your body with your forearms. 2. Press your elbows down into the floor to raise your upper back.  As you do this, relax your stomach muscles and allow your back to arch without using your back muscles. As your press up, do not let your hips or pelvis come off the floor. 3. Hold for 15 to 30 seconds, then relax. 4. Repeat 2 to 4 times. Alternate arm and leg (bird dog) exercise Do this exercise slowly. Try to keep your body straight at all times, and do not let one hip drop lower than the other. 1. Start on the floor, on your hands and knees. 2. Tighten your belly muscles. 3. Raise one leg off the floor, and hold it straight out behind you. Be careful not to let your hip drop down, because that will twist your trunk. 4. Hold for about 6 seconds, then lower your leg and switch to the other leg. 5. Repeat 8 to 12 times on each leg. 6. Over time, work up to holding for 10 to 30 seconds each time. 7. If you feel stable and secure with your leg raised, try raising the opposite arm straight out in front of you at the same time. Knee-to-chest exercise 1. Lie on your back with your knees bent and your feet flat on the floor. 2. Bring one knee to your chest, keeping the other foot flat on the floor (or keeping the other leg straight, whichever feels better on your lower back). 3. Keep your lower back pressed to the floor. Hold for at least 15 to 30 seconds. 4. Relax, and lower the knee to the starting position. 5. Repeat with the other leg. Repeat 2 to 4 times with each leg. 6. To get more stretch, put your other leg flat on the floor while pulling your knee to your chest. 
Curl-ups 1. Lie on the floor on your back with your knees bent at a 90-degree angle. Your feet should be flat on the floor, about 12 inches from your buttocks. 2. Cross your arms over your chest. If this bothers your neck, try putting your hands behind your neck (not your head), with your elbows spread apart. 3. Slowly tighten your belly muscles and raise your shoulder blades off the floor. 4. Keep your head in line with your body, and do not press your chin to your chest. 
5. Hold this position for 1 or 2 seconds, then slowly lower yourself back down to the floor. 6. Repeat 8 to 12 times. Pelvic tilt exercise 1. Lie on your back with your knees bent. 2. \"Brace\" your stomach. This means to tighten your muscles by pulling in and imagining your belly button moving toward your spine. You should feel like your back is pressing to the floor and your hips and pelvis are rocking back. 3. Hold for about 6 seconds while you breathe smoothly. 4. Repeat 8 to 12 times. Heel dig bridging 1. Lie on your back with both knees bent and your ankles bent so that only your heels are digging into the floor. Your knees should be bent about 90 degrees. 2. Then push your heels into the floor, squeeze your buttocks, and lift your hips off the floor until your shoulders, hips, and knees are all in a straight line. 3. Hold for about 6 seconds as you continue to breathe normally, and then slowly lower your hips back down to the floor and rest for up to 10 seconds. 4. Do 8 to 12 repetitions. Hamstring stretch in doorway 1. Lie on your back in a doorway, with one leg through the open door. 2. Slide your leg up the wall to straighten your knee. You should feel a gentle stretch down the back of your leg. 3. Hold the stretch for at least 15 to 30 seconds. Do not arch your back, point your toes, or bend either knee. Keep one heel touching the floor and the other heel touching the wall. 4. Repeat with your other leg. 5. Do 2 to 4 times for each leg. Hip flexor stretch 1. Kneel on the floor with one knee bent and one leg behind you. Place your forward knee over your foot. Keep your other knee touching the floor. 2. Slowly push your hips forward until you feel a stretch in the upper thigh of your rear leg. 3. Hold the stretch for at least 15 to 30 seconds. Repeat with your other leg. 4. Do 2 to 4 times on each side. Wall sit 1. Stand with your back 10 to 12 inches away from a wall. 2. Lean into the wall until your back is flat against it. 3. Slowly slide down until your knees are slightly bent, pressing your lower back into the wall. 4. Hold for about 6 seconds, then slide back up the wall. 5. Repeat 8 to 12 times. Follow-up care is a key part of your treatment and safety. Be sure to make and go to all appointments, and call your doctor if you are having problems. It's also a good idea to know your test results and keep a list of the medicines you take. Where can you learn more? Go to http://octavio-yuliana.info/. Enter Y740 in the search box to learn more about \"Low Back Pain: Exercises. \" Current as of: November 29, 2017 Content Version: 11.7 © 1112-6361 [employer]. Care instructions adapted under license by [employer] (which disclaims liability or warranty for this information). If you have questions about a medical condition or this instruction, always ask your healthcare professional. [address] any warranty or liability for your use of this information. Patient Instructions History Introducing [facility]! Saman Jimenez introduces [email or web address] patient portal. Now you can access parts of your medical record, email your doctor's office, and request medication refills online. 1. In your internet browser, go to [email or web address] 2. Click on the First Time User? Click Here link in the Sign In box. You will see the New Member Sign Up page. 3. Enter your [email or web address] Access Code exactly as it appears below. You will not need to use this code after youve completed the sign-up process. If you do not sign up before the expiration date, you must request a new code. · [email or web address] Access Code: [email or web address] Expires: 11/25/2018  8:27 AM 
 
 4. Enter the last four digits of your Social Security Number (xxxx) and Date of Birth (mm/dd/yyyy) as indicated and click Submit. You will be taken to the next sign-up page. 5. Create a [employer] ID. This will be your [employer] login ID and cannot be changed, so think of one that is secure and easy to remember. 6. Create a [employer] password. You can change your password at any time. 7. Enter your Password Reset Question and Answer. This can be used at a later time if you forget your password. 8. Enter your e-mail address. You will receive e-mail notification when new information is available in [address] 9. Click Sign Up. You can now view and download portions of your medical record. 10. Click the Download Summary menu link to download a portable copy of your medical information. If you have questions, please visit the Frequently Asked Questions section of the [employer] website. Remember, [employer] is NOT to be used for urgent needs. For medical emergencies, dial 911. Now available from your iPhone and Android! Please provide this summary of care documentation to your next provider. Your primary care clinician is listed as Renard Green. If you have any questions after today's visit, please call [phone].

## 2018-08-27 NOTE — PROGRESS NOTES
Acute Care Note    Macie Guadalupe is 50 y.o. female. she presents for evaluation of LOW BACK PAIN      Back Pain  Patient presents for presents evaluation of low back problems. Symptoms have been present for 1 month and include stiffness in the lower back with some episodic crepitus. Initial inciting event: she had started doing \"hot yoga\" and noted that the symptom occurred more. Symptoms are worst: at any time. Alleviating factors identifiable by patient are recumbency. Exacerbating factors identifiable by patient are doing the yoga as stated. Treatments so far initiated by patient: infrequent OTC medication use. Previous lower back problems: some self-limited pain in the past.  She has had hip X-rays but these have shown only mild degenerative changes. Prior to Admission medications    Medication Sig Start Date End Date Taking? Authorizing Provider   cetirizine (ZYRTEC) 10 mg tablet Take  by mouth. Yes Historical Provider   diclofenac EC (VOLTAREN) 75 mg EC tablet TAKE 1 TAB BY MOUTH TWO (2) TIMES A DAY. 4/4/18  Yes Gabe John MD   tolterodine ER (DETROL-LA) 2 mg ER capsule TAKE ONE CAPSULE EVERY DAY 1/15/18  Yes Gabe John MD   diphenhydrAMINE (BENADRYL) 25 mg capsule Take 25 mg by mouth every six (6) hours as needed. Yes Historical Provider   butalbital-acetaminophen-caffeine (FIORICET, ESGIC) -40 mg per tablet TAKE 1 TABLET EVERY 4 HOURS AS NEEDED FOR HEADACHE 1/15/18  Yes Gabe John MD   mometasone (ELOCON) 0.1 % ointment Apply  to affected area daily. 1/15/18  Yes Gabe John MD   multivitamin (ONE A DAY) tablet Take 1 tablet by mouth daily. Yes Historical Provider   methylPREDNISolone (MEDROL DOSEPACK) 4 mg tablet Take 1 Tab by mouth Specific Days and Specific Times. 3/23/18   Gabe John MD   ondansetron (ZOFRAN ODT) 8 mg disintegrating tablet Take 1 Tab by mouth every six (6) hours as needed for Nausea.  2/28/18   Gabe John MD   suvorexant (BELSOMRA) 5 mg tablet Take 1 Tab by mouth nightly as needed for Insomnia. Max Daily Amount: 5 mg. 8/7/17   Grupo Santana MD   melatonin tab tablet Take  by mouth nightly. Historical Provider         Patient Active Problem List   Diagnosis Code    Depression F32.9    Migraines G43.909    HTN (hypertension) I10    Obesity E66.9         Review of Systems   Constitutional: Negative. Respiratory: Negative. Cardiovascular: Negative. Gastrointestinal: Negative. Musculoskeletal: Positive for back pain. Visit Vitals    /72 (BP 1 Location: Right arm, BP Patient Position: Sitting)    Pulse 87    Temp 98.7 °F (37.1 °C) (Oral)    Resp 16    Ht 5' 2\" (1.575 m)    Wt 168 lb (76.2 kg)    SpO2 98%    BMI 30.73 kg/m2       Physical Exam   Constitutional: No distress. Musculoskeletal: She exhibits no edema or tenderness. Lumbar back: She exhibits no spasm. ASSESSMENT/PLAN  Diagnoses and all orders for this visit:    1. Lumbar pain - For acute pain, rest, intermittent application of heat (do not sleep on heating pad), analgesics and muscle relaxants are recommended. Discussed longer term treatment plan of prn NSAID's and discussed a home back care exercise program with flexion exercise routine. Proper lifting with avoidance of heavy lifting discussed. We will order an XR now to evaluate. -     XR SPINE LUMB 2 OR 3 V; Future       Advised the patient to call back or return to office if symptoms worsen/change/persist.   Discussed expected course/resolution/complications of diagnosis in detail with patient. Medication risks/benefits/costs/interactions/alternatives discussed with patient. The patient was given an after visit summary which includes diagnoses, current medications, & vitals. They expressed understanding with the diagnosis and plan.

## 2018-08-27 NOTE — PATIENT INSTRUCTIONS

## 2018-09-12 ENCOUNTER — TELEPHONE (OUTPATIENT)
Dept: INTERNAL MEDICINE CLINIC | Age: 48
End: 2018-09-12

## 2018-09-12 NOTE — TELEPHONE ENCOUNTER
Patient informed per XR spine results, patient states back is about the same and takes diclofenac as needed, informed patient to notify the office if further assistance is required.

## 2018-09-12 NOTE — TELEPHONE ENCOUNTER
----- Message from Olga Das MD sent at 9/12/2018  8:14 AM EDT -----  Pls let the patient know that her XR of the spine was normal.  Also ask how she is feeling. Has her back pain improved?

## 2018-09-19 DIAGNOSIS — M54.50 LUMBAR PAIN: ICD-10-CM

## 2018-09-21 ENCOUNTER — TELEPHONE (OUTPATIENT)
Dept: INTERNAL MEDICINE CLINIC | Age: 48
End: 2018-09-21

## 2018-09-21 NOTE — TELEPHONE ENCOUNTER
Patient requesting back exercises given during last office visit mailed. Informed patient will mail today.  Pt verbalized understanding

## 2018-09-24 DIAGNOSIS — M79.18 RIGHT BUTTOCK PAIN: ICD-10-CM

## 2018-09-24 RX ORDER — DICLOFENAC SODIUM 75 MG/1
TABLET, DELAYED RELEASE ORAL
Qty: 180 TAB | Refills: 0 | Status: SHIPPED | OUTPATIENT
Start: 2018-09-24 | End: 2021-10-28

## 2018-10-30 ENCOUNTER — OFFICE VISIT (OUTPATIENT)
Dept: INTERNAL MEDICINE CLINIC | Age: 48
End: 2018-10-30

## 2018-10-30 VITALS
HEIGHT: 62 IN | DIASTOLIC BLOOD PRESSURE: 80 MMHG | OXYGEN SATURATION: 99 % | WEIGHT: 167.4 LBS | TEMPERATURE: 98 F | SYSTOLIC BLOOD PRESSURE: 138 MMHG | HEART RATE: 88 BPM | RESPIRATION RATE: 16 BRPM | BODY MASS INDEX: 30.8 KG/M2

## 2018-10-30 DIAGNOSIS — J30.2 SEASONAL ALLERGIC RHINITIS, UNSPECIFIED TRIGGER: ICD-10-CM

## 2018-10-30 DIAGNOSIS — L20.84 INTRINSIC ECZEMA: ICD-10-CM

## 2018-10-30 DIAGNOSIS — H10.33 ACUTE BACTERIAL CONJUNCTIVITIS OF BOTH EYES: Primary | ICD-10-CM

## 2018-10-30 RX ORDER — OFLOXACIN 3 MG/ML
2 SOLUTION/ DROPS OPHTHALMIC 4 TIMES DAILY
Qty: 5 ML | Refills: 0 | Status: SHIPPED | OUTPATIENT
Start: 2018-10-30 | End: 2018-11-06

## 2018-10-30 RX ORDER — CETIRIZINE HCL 10 MG
TABLET ORAL
COMMUNITY
Start: 2018-10-30 | End: 2020-04-22

## 2018-10-30 RX ORDER — MOMETASONE FUROATE 1 MG/G
OINTMENT TOPICAL DAILY
Qty: 45 G | Refills: 0 | Status: SHIPPED | OUTPATIENT
Start: 2018-10-30 | End: 2019-11-22

## 2018-10-30 RX ORDER — FLUTICASONE PROPIONATE 50 MCG
2 SPRAY, SUSPENSION (ML) NASAL DAILY
Qty: 1 BOTTLE | Refills: 5 | Status: SHIPPED | OUTPATIENT
Start: 2018-10-30 | End: 2019-06-02 | Stop reason: SDUPTHER

## 2018-10-30 NOTE — PROGRESS NOTES
Chief Complaint   Patient presents with    Pink Eye     both eyes    Dry Skin     Patient states she is here for bilateral pink eye. Patient states both eyes itch, red and gets crusty in the morning. Patient also states her eczema has been flaring up lately. Patient states her legs, upper chest, mid back and left arm. Patient would like to have something to treat it.

## 2018-10-30 NOTE — PROGRESS NOTES
Subjective:   Noe Llanes is a 46 y.o. female who She complains of itchy eyes with sticky discharge for the past 4-5 days. She also complains of nasal drainage. No cough or fever. No ear pain. Also flare of her eczema. She denies a history of shortness of breath and wheezing. Evaluation to date: none. Treatment to date: OTC products. Patient does not smoke cigarettes. Relevant PMH: No pertinent additional PMH. Current Outpatient Medications   Medication Sig Dispense Refill    diclofenac EC (VOLTAREN) 75 mg EC tablet TAKE 1 TAB BY MOUTH TWO (2) TIMES A DAY. 180 Tab 0    cetirizine (ZYRTEC) 10 mg tablet Take  by mouth.  ondansetron (ZOFRAN ODT) 8 mg disintegrating tablet Take 1 Tab by mouth every six (6) hours as needed for Nausea. 30 Tab 0    diphenhydrAMINE (BENADRYL) 25 mg capsule Take 25 mg by mouth every six (6) hours as needed.  butalbital-acetaminophen-caffeine (FIORICET, ESGIC) -40 mg per tablet TAKE 1 TABLET EVERY 4 HOURS AS NEEDED FOR HEADACHE 30 Tab 2    melatonin tab tablet Take  by mouth nightly.  methylPREDNISolone (MEDROL DOSEPACK) 4 mg tablet Take 1 Tab by mouth Specific Days and Specific Times. 1 Dose Pack 0    tolterodine ER (DETROL-LA) 2 mg ER capsule TAKE ONE CAPSULE EVERY DAY 90 Cap 1    mometasone (ELOCON) 0.1 % ointment Apply  to affected area daily. 15 g 0    suvorexant (BELSOMRA) 5 mg tablet Take 1 Tab by mouth nightly as needed for Insomnia. Max Daily Amount: 5 mg. 30 Tab 3    multivitamin (ONE A DAY) tablet Take 1 tablet by mouth daily. Allergies   Allergen Reactions    Shellfish Derived Hives, Itching and Swelling     Eye swelling    Sulfa (Sulfonamide Antibiotics) Other (comments)     Abdominal pain        Review of Systems  Pertinent items are noted in HPI.     Objective:     Visit Vitals  /80 (BP 1 Location: Right arm, BP Patient Position: Sitting)   Pulse 88   Temp 98 °F (36.7 °C) (Oral)   Resp 16   Ht 5' 2\" (1.575 m)   Wt 167 lb 6.4 oz (75.9 kg)   SpO2 99%   BMI 30.62 kg/m²     General:  alert, cooperative, no distress   Eyes: negative   Ears: normal TM's and external ear canals AU   Sinuses:  Nasal congestion, sinus nontender. Mouth:  normal findings: oropharynx pink & moist without lesions or evidence of thrush   Neck: supple, symmetrical, trachea midline and no adenopathy. Heart: normal rate and regular rhythm. Lungs: clear to auscultation bilaterally   Abdomen: soft, non-tender. Bowel sounds normal. No masses,  no organomegaly   Skin  dry patches noted on bilateral lower legs, arms and anterior chest.  Excoriations present. Assessment/Plan:     Encounter Diagnoses   Name Primary?  Acute bacterial conjunctivitis of both eyes Yes    Seasonal allergic rhinitis, unspecified trigger     Intrinsic eczema    . plan:   Orders Placed This Encounter    ofloxacin (FLOXIN) 0.3 % ophthalmic solution    fluticasone (FLONASE) 50 mcg/actuation nasal spray    mometasone (ELOCON) 0.1 % ointment   . Zyrtec as needed  I have discussed the diagnosis with the patient and the intended plan as seen in the above orders. The patient has received an after-visit summary and questions were answered concerning future plans. I have discussed medication side effects and warnings with the patient as well. She has expressed understanding of the diagnosis and plan    Follow-up Disposition:  Return if symptoms worsen or fail to improve.

## 2018-11-07 ENCOUNTER — TELEPHONE (OUTPATIENT)
Dept: INTERNAL MEDICINE CLINIC | Age: 48
End: 2018-11-07

## 2018-11-07 NOTE — TELEPHONE ENCOUNTER
Patient still having back pain. Taking prescribed medication and utilizing exercises. Pain is not every day, more of an ache.  She had xray and would like to have further testing if recommended

## 2018-11-09 NOTE — TELEPHONE ENCOUNTER
Informed patient referral to [facility], Dr Luz Bourgeois for further evaluation. Pt verbalized understanding.

## 2018-11-29 DIAGNOSIS — G43.009 MIGRAINE WITHOUT AURA AND WITHOUT STATUS MIGRAINOSUS, NOT INTRACTABLE: ICD-10-CM

## 2018-11-30 RX ORDER — BUTALBITAL, ACETAMINOPHEN AND CAFFEINE 50; 325; 40 MG/1; MG/1; MG/1
TABLET ORAL
Qty: 30 TAB | Refills: 2 | Status: SHIPPED | OUTPATIENT
Start: 2018-11-30 | End: 2019-08-28 | Stop reason: SDUPTHER

## 2019-03-05 ENCOUNTER — OFFICE VISIT (OUTPATIENT)
Dept: INTERNAL MEDICINE CLINIC | Age: 49
End: 2019-03-05

## 2019-03-05 VITALS
BODY MASS INDEX: 31.1 KG/M2 | HEIGHT: 62 IN | HEART RATE: 80 BPM | RESPIRATION RATE: 19 BRPM | OXYGEN SATURATION: 98 % | SYSTOLIC BLOOD PRESSURE: 120 MMHG | WEIGHT: 169 LBS | TEMPERATURE: 98.2 F | DIASTOLIC BLOOD PRESSURE: 81 MMHG

## 2019-03-05 DIAGNOSIS — S93.402A SPRAIN OF LEFT ANKLE, UNSPECIFIED LIGAMENT, INITIAL ENCOUNTER: Primary | ICD-10-CM

## 2019-03-05 RX ORDER — CYCLOBENZAPRINE HCL 10 MG
TABLET ORAL
COMMUNITY
End: 2019-06-05 | Stop reason: SDUPTHER

## 2019-03-05 NOTE — PATIENT INSTRUCTIONS
Ankle Sprain: Care Instructions  Your Care Instructions    An ankle sprain can happen when you twist your ankle. The ligaments that support the ankle can get stretched and torn. Often the ankle is swollen and painful. Ankle sprains may take from several weeks to several months to heal. Usually, the more pain and swelling you have, the more severe your ankle sprain is and the longer it will take to heal. You can heal faster and regain strength in your ankle with good home treatment. It is very important to give your ankle time to heal completely, so that you do not easily hurt your ankle again. Follow-up care is a key part of your treatment and safety. Be sure to make and go to all appointments, and call your doctor if you are having problems. It's also a good idea to know your test results and keep a list of the medicines you take. How can you care for yourself at home? · Prop up your foot on pillows as much as possible for the next 3 days. Try to keep your ankle above the level of your heart. This will help reduce the swelling. · Follow your doctor's directions for wearing a splint or elastic bandage. Wrapping the ankle may help reduce or prevent swelling. · Your doctor may give you a splint, a brace, an air stirrup, or another form of ankle support to protect your ankle until it is healed. Wear it as directed while your ankle is healing. Do not remove it unless your doctor tells you to. After your ankle has healed, ask your doctor whether you should wear the brace when you exercise. · Put ice or cold packs on your injured ankle for 10 to 20 minutes at a time. Try to do this every 1 to 2 hours for the next 3 days (when you are awake) or until the swelling goes down. Put a thin cloth between the ice and your skin. · You may need to use crutches until you can walk without pain. If you do use crutches, try to bear some weight on your injured ankle if you can do so without pain.  This helps the ankle heal.  · Take pain medicines exactly as directed. ? If the doctor gave you a prescription medicine for pain, take it as prescribed. ? If you are not taking a prescription pain medicine, ask your doctor if you can take an over-the-counter medicine. · If you have been given ankle exercises to do at home, do them exactly as instructed. These can promote healing and help prevent lasting weakness. When should you call for help? Call your doctor now or seek immediate medical care if:    · Your pain is getting worse.     · Your swelling is getting worse.     · Your splint feels too tight or you are unable to loosen it.    Watch closely for changes in your health, and be sure to contact your doctor if:    · You are not getting better after 1 week. Where can you learn more? Go to http://octavio-yuliana.info/. Enter Y572 in the search box to learn more about \"Ankle Sprain: Care Instructions. \"  Current as of: September 20, 2018  Content Version: 11.9  © 0102-8631 [employer]. Care instructions adapted under license by [employer] (which disclaims liability or warranty for this information). If you have questions about a medical condition or this instruction, always ask your healthcare professional. [address] [address] any warranty or liability for your use of this information.

## 2019-03-05 NOTE — PROGRESS NOTES
Acute Care Note    Ezra Jarrell is 46 y.o. female. she presents for evaluation of Ankle Injury and Knee Injury    Ankle Pain  Patient complains of left ankle pain. Onset of the symptoms was several days ago. Inciting event: injured while exercising. Current symptoms include ability to bear weight, but with some pain. Aggravating symptoms: going up and down stairs, walking, squatting. Patient's overall course: stable. Patient has had no prior ankle problems. Previous visits for this problem: none. Evaluation to date: none. Treatment to date: OTC analgesics: somewhat effective. Prior to Admission medications    Medication Sig Start Date End Date Taking? Authorizing Provider   cyclobenzaprine (FLEXERIL) 10 mg tablet Take  by mouth three (3) times daily as needed for Muscle Spasm(s). Yes Provider, Historical   butalbital-acetaminophen-caffeine (FIORICET, ESGIC) -40 mg per tablet TAKE 1 TABLET BY MOUTH EVERY 4 HOURS AS NEEDED FOR HEADACHE 11/30/18  Yes Reed Mcelroy MD   mometasone (ELOCON) 0.1 % ointment Apply  to affected area daily. 10/30/18  Yes Lenka Dorman MD   fluticasone (FLONASE) 50 mcg/actuation nasal spray 2 Sprays by Both Nostrils route daily. 10/30/18   Lenka Dorman MD   cetirizine (ZYRTEC) 10 mg tablet Take  by mouth. 10/30/18   Lenka Dorman MD   diclofenac EC (VOLTAREN) 75 mg EC tablet TAKE 1 TAB BY MOUTH TWO (2) TIMES A DAY. 9/24/18   Reed Mcelroy MD   ondansetron (ZOFRAN ODT) 8 mg disintegrating tablet Take 1 Tab by mouth every six (6) hours as needed for Nausea. 2/28/18   Reed Mcelroy MD   diphenhydrAMINE (BENADRYL) 25 mg capsule Take 25 mg by mouth every six (6) hours as needed. Provider, Historical   melatonin tab tablet Take  by mouth nightly.     Provider, Historical         Patient Active Problem List   Diagnosis Code    Depression F32.9    Migraines G43.909    HTN (hypertension) I10    Obesity E66.9         Review of Systems Constitutional: Negative. Cardiovascular: Negative. Genitourinary: Negative. Musculoskeletal: Positive for joint pain. Visit Vitals  /81 (BP 1 Location: Right arm, BP Patient Position: Sitting)   Pulse 80   Temp 98.2 °F (36.8 °C) (Oral)   Resp 19   Ht 5' 2\" (1.575 m)   Wt 169 lb (76.7 kg)   SpO2 98%   BMI 30.91 kg/m²       Physical Exam   Musculoskeletal:   Mild tenderness at the lateral malleolus of the left ankle. ASSESSMENT/PLAN  Diagnoses and all orders for this visit:    1. Sprain of left ankle, unspecified ligament, initial encounter - Reviewed the use of rest, icing the affected joint and compression along with elevation. The patient will also use NSAID's to aid in recovery. They will follow up if any worsening of symptoms or if no improvement. The patient agrees with and understands the plan of care. All questions have been answered. Advised the patient to call back or return to office if symptoms worsen/change/persist.   Discussed expected course/resolution/complications of diagnosis in detail with patient. Medication risks/benefits/costs/interactions/alternatives discussed with patient. The patient was given an after visit summary which includes diagnoses, current medications, & vitals. They expressed understanding with the diagnosis and plan.

## 2019-06-05 ENCOUNTER — OFFICE VISIT (OUTPATIENT)
Dept: INTERNAL MEDICINE CLINIC | Age: 49
End: 2019-06-05

## 2019-06-05 VITALS
TEMPERATURE: 99.2 F | RESPIRATION RATE: 18 BRPM | HEIGHT: 62 IN | HEART RATE: 73 BPM | WEIGHT: 162 LBS | SYSTOLIC BLOOD PRESSURE: 128 MMHG | BODY MASS INDEX: 29.81 KG/M2 | OXYGEN SATURATION: 96 % | DIASTOLIC BLOOD PRESSURE: 81 MMHG

## 2019-06-05 DIAGNOSIS — H11.32 SUBCONJUNCTIVAL HEMORRHAGE OF LEFT EYE: Primary | ICD-10-CM

## 2019-06-05 DIAGNOSIS — M54.50 LUMBAR PAIN: ICD-10-CM

## 2019-06-05 RX ORDER — FLUTICASONE PROPIONATE 50 MCG
SPRAY, SUSPENSION (ML) NASAL
Qty: 1 BOTTLE | Refills: 0 | Status: SHIPPED | OUTPATIENT
Start: 2019-06-05 | End: 2020-08-13

## 2019-06-05 RX ORDER — GLUCOSAMINE/CHONDR SU A SOD 750-600 MG
TABLET ORAL
COMMUNITY

## 2019-06-05 RX ORDER — ACETAMINOPHEN 500 MG
TABLET ORAL 2 TIMES DAILY
COMMUNITY

## 2019-06-05 RX ORDER — CYCLOBENZAPRINE HCL 10 MG
10 TABLET ORAL
Qty: 30 TAB | Refills: 0 | Status: SHIPPED | OUTPATIENT
Start: 2019-06-05 | End: 2021-01-03

## 2019-06-06 NOTE — PROGRESS NOTES
Acute Care Note    Natanael Sims is 46 y.o. female. she presents for evaluation of Eye Problem    The patient presents with redness at the conjunctiva immediately superior to the iris of the left eye. She notes that this has been present for the past 2 days. She denies pain or trauma to the eye. She has lumbar pain as well. This present at the lower back worsened with movement/bending. She has recently participated in a \"Biggest Loser\" Competition. She was exercising much more than she normally does. Prior to Admission medications    Medication Sig Start Date End Date Taking? Authorizing Provider   fluticasone propionate (FLONASE) 50 mcg/actuation nasal spray USE 2 SPRAYS IN BOTH NOSTRILS DAILY 6/5/19  Yes Arnie Genao MD   Biotin 2,500 mcg cap Take  by mouth. Yes Provider, Historical   cholecalciferol (VITAMIN D3) 2,000 unit cap capsule Take  by mouth two (2) times a day. Yes Provider, Historical   multivitamin, tx-iron-ca-min (THERA-M W/ IRON) 9 mg iron-400 mcg tab tablet Take 1 Tab by mouth daily. Yes Provider, Historical   cyclobenzaprine (FLEXERIL) 10 mg tablet Take 1 Tab by mouth three (3) times daily as needed for Muscle Spasm(s). 6/5/19  Yes Tiffany Wiggins MD   butalbital-acetaminophen-caffeine (FIORICET, ESGIC) -40 mg per tablet TAKE 1 TABLET BY MOUTH EVERY 4 HOURS AS NEEDED FOR HEADACHE 11/30/18  Yes Tiffany Wiggins MD   cetirizine (ZYRTEC) 10 mg tablet Take  by mouth. 10/30/18  Yes Arnie Genao MD   mometasone (ELOCON) 0.1 % ointment Apply  to affected area daily. 10/30/18   Arnie Genao MD   diclofenac EC (VOLTAREN) 75 mg EC tablet TAKE 1 TAB BY MOUTH TWO (2) TIMES A DAY. 9/24/18   Tiffany Wiggins MD   ondansetron (ZOFRAN ODT) 8 mg disintegrating tablet Take 1 Tab by mouth every six (6) hours as needed for Nausea. 2/28/18   Tiffany Wiggins MD   diphenhydrAMINE (BENADRYL) 25 mg capsule Take 25 mg by mouth every six (6) hours as needed.     Provider, Historical   melatonin tab tablet Take  by mouth nightly. Provider, Historical         Patient Active Problem List   Diagnosis Code    Depression F32.9    Migraines G43.909    HTN (hypertension) I10    Obesity E66.9         Review of Systems   Constitutional: Negative. Eyes: Positive for redness. Musculoskeletal: Positive for back pain. Visit Vitals  /81 (BP 1 Location: Right arm, BP Patient Position: Sitting)   Pulse 73   Temp 99.2 °F (37.3 °C) (Oral)   Resp 18   Ht 5' 2\" (1.575 m)   Wt 162 lb (73.5 kg)   SpO2 96%   BMI 29.63 kg/m²       Physical Exam   Constitutional: No distress. Eyes: Pupils are equal, round, and reactive to light. EOM are normal. Left conjunctiva has a hemorrhage (present immediately above the iris). ASSESSMENT/PLAN  Diagnoses and all orders for this visit:    1. Subconjunctival hemorrhage of left eye    2. Lumbar pain  -     cyclobenzaprine (FLEXERIL) 10 mg tablet; Take 1 Tab by mouth three (3) times daily as needed for Muscle Spasm(s). Advised the patient to call back or return to office if symptoms worsen/change/persist.   Discussed expected course/resolution/complications of diagnosis in detail with patient. Medication risks/benefits/costs/interactions/alternatives discussed with patient. The patient was given an after visit summary which includes diagnoses, current medications, & vitals. They expressed understanding with the diagnosis and plan.

## 2019-11-22 ENCOUNTER — OFFICE VISIT (OUTPATIENT)
Dept: INTERNAL MEDICINE CLINIC | Age: 49
End: 2019-11-22

## 2019-11-22 VITALS
RESPIRATION RATE: 18 BRPM | HEIGHT: 62 IN | SYSTOLIC BLOOD PRESSURE: 100 MMHG | OXYGEN SATURATION: 96 % | HEART RATE: 100 BPM | DIASTOLIC BLOOD PRESSURE: 81 MMHG | TEMPERATURE: 98.3 F | BODY MASS INDEX: 29.63 KG/M2

## 2019-11-22 DIAGNOSIS — M25.531 RIGHT WRIST PAIN: Primary | ICD-10-CM

## 2019-11-22 DIAGNOSIS — Z13.220 LIPID SCREENING: ICD-10-CM

## 2019-11-22 DIAGNOSIS — G43.009 MIGRAINE WITHOUT AURA AND WITHOUT STATUS MIGRAINOSUS, NOT INTRACTABLE: ICD-10-CM

## 2019-11-22 DIAGNOSIS — R73.09 ELEVATED HEMOGLOBIN A1C: ICD-10-CM

## 2019-11-22 DIAGNOSIS — L30.9 ECZEMA, UNSPECIFIED TYPE: ICD-10-CM

## 2019-11-22 RX ORDER — BETAMETHASONE DIPROPIONATE 0.5 MG/G
OINTMENT TOPICAL 2 TIMES DAILY
Qty: 15 G | Refills: 0 | Status: SHIPPED | OUTPATIENT
Start: 2019-11-22 | End: 2020-12-10

## 2019-11-22 RX ORDER — BUTALBITAL, ACETAMINOPHEN AND CAFFEINE 50; 325; 40 MG/1; MG/1; MG/1
1 TABLET ORAL
Qty: 30 TAB | Refills: 1 | Status: SHIPPED | OUTPATIENT
Start: 2019-11-22 | End: 2021-01-03

## 2019-11-22 RX ORDER — BETAMETHASONE DIPROPIONATE 0.5 MG/G
OINTMENT TOPICAL 2 TIMES DAILY
COMMUNITY
End: 2019-11-22 | Stop reason: SDUPTHER

## 2019-11-22 RX ORDER — MULTIVIT WITH MINERALS/HERBS
1 TABLET ORAL DAILY
COMMUNITY

## 2019-11-22 RX ORDER — VITAMIN E CAP 100 UNIT 100 UNIT
CAP ORAL DAILY
COMMUNITY

## 2019-11-22 NOTE — PROGRESS NOTES
Acute Care Note    Kely Cardenas is 44 y.o. female. she presents for evaluation of Wrist Pain    Wrist Pain  Patient complains of right wrist pain. The pain is moderate, worsens with movement, and some relief by rest.  There is not associated numbness, tingling in the right fingers. Pain has been present for several weeks. There is a history of overuse. She has used [employer] to exercise recently. She also has had some rash of eczema on her right breast.  This occurs seasonally. She is asking for a refill of a steroid cream that she has been given previously. Otherwise, she has not had a CPE recently. We will check labs and have the patient return for a wellness visit. Prior to Admission medications    Medication Sig Start Date End Date Taking? Authorizing Provider   b complex vitamins (B COMPLEX 1) tablet Take 1 Tab by mouth daily. Yes Provider, Historical   vitamin e (E GEMS) 100 unit capsule Take  by mouth daily. Yes Provider, Historical   ondansetron (ZOFRAN ODT) 8 mg disintegrating tablet DISSOLVE 1 TABLET BY MOUTH EVERY 6 HOURS AS NEEDED FOR NAUSEA 8/28/19  Yes Cabrera Mckee MD   butalbital-acetaminophen-caffeine (FIORICET, ESGIC) -40 mg per tablet TAKE 1 TABLET BY MOUTH EVERY 4 HOURS AS NEEDED FOR HEADACHE 8/28/19  Yes Cabrera Mckee MD   fluticasone propionate (FLONASE) 50 mcg/actuation nasal spray USE 2 SPRAYS IN BOTH NOSTRILS DAILY 6/5/19  Yes Regina Davis MD   Biotin 2,500 mcg cap Take  by mouth. Yes Provider, Historical   cholecalciferol (VITAMIN D3) 2,000 unit cap capsule Take  by mouth two (2) times a day. Yes Provider, Historical   multivitamin, tx-iron-ca-min (THERA-M W/ IRON) 9 mg iron-400 mcg tab tablet Take 1 Tab by mouth daily. Yes Provider, Historical   cyclobenzaprine (FLEXERIL) 10 mg tablet Take 1 Tab by mouth three (3) times daily as needed for Muscle Spasm(s).  6/5/19  Yes Cabrera Mckee MD   mometasone (ELOCON) 0.1 % ointment Apply  to affected area daily. 10/30/18  Yes Grant Dove MD   cetirizine (ZYRTEC) 10 mg tablet Take  by mouth. 10/30/18  Yes Grant Dove MD   diphenhydrAMINE (BENADRYL) 25 mg capsule Take 25 mg by mouth every six (6) hours as needed. Yes Provider, Historical   diclofenac EC (VOLTAREN) 75 mg EC tablet TAKE 1 TAB BY MOUTH TWO (2) TIMES A DAY. 9/24/18   Kevin Lockhart MD   melatonin tab tablet Take  by mouth nightly. Provider, Historical         Patient Active Problem List   Diagnosis Code    Depression F32.9    Migraines G43.909    HTN (hypertension) I10    Obesity E66.9         Review of Systems   Constitutional: Negative. Respiratory: Negative. Cardiovascular: Negative. Gastrointestinal: Negative. Visit Vitals  /81 (BP 1 Location: Right arm, BP Patient Position: Sitting)   Pulse 100   Temp 98.3 °F (36.8 °C) (Oral)   Resp 18   Ht 5' 2\" (1.575 m)   SpO2 96%   BMI 29.63 kg/m²       Physical Exam  Cardiovascular:      Rate and Rhythm: Normal rate and regular rhythm. Musculoskeletal:      Right wrist: She exhibits tenderness. She exhibits no swelling and no effusion. Skin:     Findings: Rash (reddish rash present at the right breast) present. ASSESSMENT/PLAN  Diagnoses and all orders for this visit:    1. Right wrist pain  -     XR WRIST RT AP/LAT; Future    2. Elevated hemoglobin A1c  -     HEMOGLOBIN A1C WITH EAG    3. Lipid screening  -     LIPID PANEL  -     METABOLIC PANEL, COMPREHENSIVE  -     CBC WITH AUTOMATED DIFF    4. Migraine without aura and without status migrainosus, not intractable  -     butalbital-acetaminophen-caffeine (FIORICET, ESGIC) -40 mg per tablet; Take 1 Tab by mouth every four (4) hours as needed for Pain. 5. Eczema, unspecified type  -     augmented betamethasone dipropionate (DIPROLENE-AF) 0.05 % ointment; Apply  to affected area two (2) times a day.          Advised the patient to call back or return to office if symptoms worsen/change/persist.   Discussed expected course/resolution/complications of diagnosis in detail with patient. Medication risks/benefits/costs/interactions/alternatives discussed with patient. The patient was given an after visit summary which includes diagnoses, current medications, & vitals. They expressed understanding with the diagnosis and plan.

## 2019-11-22 NOTE — PATIENT INSTRUCTIONS
Learning About RICE (Rest, Ice, Compression, and Elevation)  What is RICE? RICE is a way to care for an injury. RICE helps relieve pain and swelling. It may also help with healing and flexibility. RICE stands for:  · Rest and protect the injured or sore area. · Ice or a cold pack used as soon as possible. · Compression, or wrapping the injured or sore area with an elastic bandage. · Elevation (propping up) the injured or sore area. How do you do RICE? You can use RICE for home treatment when you have general aches and pains or after an injury or surgery. Rest  · Do not put weight on the injury for at least 24 to 48 hours. · Use crutches for a badly sprained knee or ankle. · Support a sprained wrist, elbow, or shoulder with a sling. Ice  · Put ice or a cold pack on the injury right away to reduce pain and swelling. Frozen vegetables will also work as an ice pack. Put a thin cloth between the ice or cold pack and your skin. The cloth protects the injured area from getting too cold. · Use ice for 10 to 15 minutes at a time for the first 48 to 72 hours. Compression  · Use compression for sprains, strains, and surgeries of the arms and legs. · Wrap the injured area with an elastic bandage or compression sleeve to reduce swelling. · Don't wrap it too tightly. If the area below it feels numb, tingles, or feels cool, loosen the wrap. Elevation  · Use elevation for areas of the body that can be propped up, such as arms and legs. · Prop up the injured area on pillows whenever you use ice. Keep it propped up anytime you sit or lie down. · Try to keep the injured area at or above the level of your heart. This will help reduce swelling and bruising. Where can you learn more? Go to http://octavio-yuliana.info/. Enter Q252 in the search box to learn more about \"Learning About RICE (Rest, Ice, Compression, and Elevation). \"  Current as of: June 26, 2019  Content Version: 12.2  © 0286-2884 [employer]. Care instructions adapted under license by [employer] (which disclaims liability or warranty for this information). If you have questions about a medical condition or this instruction, always ask your healthcare professional. [address] any warranty or liability for your use of this information.

## 2019-11-27 LAB
CREATININE, EXTERNAL: 0.78
HBA1C MFR BLD HPLC: 5.6 %
LDL-C, EXTERNAL: 129

## 2019-12-03 ENCOUNTER — TELEPHONE (OUTPATIENT)
Dept: INTERNAL MEDICINE CLINIC | Age: 49
End: 2019-12-03

## 2019-12-03 NOTE — TELEPHONE ENCOUNTER
Patient states labs/xray completed at [facility], wanted to know if received. Per provider xray normal. Do not have labs. Patient will have labs forward for review.

## 2019-12-18 DIAGNOSIS — M25.531 RIGHT WRIST PAIN: ICD-10-CM

## 2020-04-09 ENCOUNTER — TELEPHONE (OUTPATIENT)
Dept: INTERNAL MEDICINE CLINIC | Age: 50
End: 2020-04-09

## 2020-04-09 NOTE — TELEPHONE ENCOUNTER
Patient states intermittent tightness in chest over last few weeks. Advised patient would need to schedule virtual visit. Patient declined. Patient request to be seen face to face. Patient states she is not having trouble breathing. Offered urgent care or ER.

## 2020-04-14 ENCOUNTER — TELEPHONE (OUTPATIENT)
Dept: CARDIOLOGY CLINIC | Age: 50
End: 2020-04-14

## 2020-04-14 NOTE — TELEPHONE ENCOUNTER
Verified patient with two types of identifiers. Spoke to patient again and she is agreeable to a VV with Dr. Yehuda Alvarez. She will call the office or seek emergency medical attention if her chest tightness worsens before her VV on 4-21-20.

## 2020-04-14 NOTE — TELEPHONE ENCOUNTER
Verified patient with two types of identifiers. Patient reports she has had chest tightness for the last few months that has progressively worsened and increased in frequency. She reports sometimes at night she has BUE tingling and a cough but denies any other symptoms. Patient reports she was seen at [facility] in [place] (records requested) two days ago and they recommended she follow up with cardiology. She is not interested in a VV at this time and wishes to be seen in the office this week. Let her know Dr. Willy Pepe is not back in the office until next week and she is agreeable to seeing on of the other doctors.

## 2020-04-14 NOTE — TELEPHONE ENCOUNTER
Patient wanting to schedule NP appt with Dr. Lady Posada for chest tightness. Was referred by an urgent care facility which did not do any cardiac testing on her. She is not sure about doing VV either and would prefer in office visit. Please advise.      Phone: [phone]

## 2020-04-21 ENCOUNTER — TELEPHONE (OUTPATIENT)
Dept: CARDIOLOGY CLINIC | Age: 50
End: 2020-04-21

## 2020-04-21 ENCOUNTER — VIRTUAL VISIT (OUTPATIENT)
Dept: CARDIOLOGY CLINIC | Age: 50
End: 2020-04-21

## 2020-04-21 DIAGNOSIS — R07.89 CHEST TIGHTNESS: ICD-10-CM

## 2020-04-21 DIAGNOSIS — R07.9 EXERTIONAL CHEST PAIN: Primary | ICD-10-CM

## 2020-04-21 DIAGNOSIS — Z82.49 FAMILY HISTORY OF ESSENTIAL HYPERTENSION: ICD-10-CM

## 2020-04-21 RX ORDER — LORATADINE 10 MG/1
10 TABLET ORAL
COMMUNITY
End: 2021-10-28

## 2020-04-21 NOTE — PROGRESS NOTES
Shree Valerio     1970       Jose Armando Hicks MD, [facility]  Date of Visit-4/21/2020   PCP is Jose F Astorga MD   [facility] [place]  [facility]  Virtual Visit  HPI:  Shree Valerio is a 44 y.o. female   who was seen by synchronous (real-time) audio-video technology on 4/21/2020. New patient consult virtual visit during pandemic. Patient reports she has had chest tightness for the last few months that has progressively worsened and increased in frequency. She reports sometimes at night she has BUE tingling and a cough but denies any other symptoms. Patient reports she was seen at [facility]     Patient usually sees Dr. Kristin Mcintosh. Today. .. She reports chest pain in the middle of her chest with a heaviness for several months. It can happen 2-3 times a week. It lasts a few minutes. It can occur while sitting. Can also occur when she runs walks or does aerobics. She says it also feels at times like a tightness  with a pulse she feels almost 120. She does not know her lipid status. She denies edema. She denies palpitations and despite the one episode of heart rate to 120 denies other periods of tachycardia. She denies hypertension diabetes. Social history non-smoker   family history mother grandmother and brother and sister all have hypertension. Available reviewed records from [facility] show that she was seen there on 4/12/2020 with a blood pressure of 120/78 pulse rate of 80 and a normal temperature. She had an oxygen saturation of 100%. She works as an RN. EKG was normal.  Her hemoglobin was 11.7. Her platelet count and white count were normal.  A chest x-ray showed no cardiopulmonary abnormality. She was seen by Dr. Belle Rodriguez. I reviewed the EKG it indeed shows sinus rhythm within normal limits at a rate of 69 with normal axis and intervals.   Assessment/Plan:    Ms. Isaak Marcial has a family history of hypertension but does not has seem to have elevated BP in the past now she is presenting with chest tightness that can occur at various times. She may also have some element of tachycardia. Sometimes people can feel that sense of tightness with CAD musculoskeletal pain but also arrhythmia and hypertension have to be in the differential.  I think the next first step is to get a Lexiscan nuclear stress test and have her keep a blood pressure diary. She should have lipids in the future. Plan lexiscan stress test      This note was created using voice recognition software. Despite editing, there may be syntax errors. Impression:   1. Exertional chest pain    2. Chest tightness    3. Family history of essential hypertension         Key CAD CHF Meds     Patient is on no cardiovascular meds. Cardiac History:   No specialty comments available. ROS-except as noted above. . A complete cardiac and respiratory are reviewed and negative except as above ; Resp-denies wheezing  or productive cough,. Const- No unusual weight loss or fever; Neuro-no recent seizure or CVA ; GI- No BRBPR, abdom pain, bloating ; - no  hematuria     Past Medical History:   Diagnosis Date    Calculus of kidney     Headache         Social Hx= reports that she has never smoked. She has never used smokeless tobacco. She reports current alcohol use. She reports that she does not use drugs. Due to this being a TeleHealth evaluation, many elements of the physical examination are unable to be assessed. General: Well developed, in no acute distress, cooperative and alert  HEENT: Pupils equal/round. No marked JVD visible on video. Respiratory: No audible wheezing, no signs of respiratory distress, lips non cyanotic  Extremities:  No edema  Neuro: A&Ox3, speech clear, no facial droop, answering questions appropriately  Skin: Skin color is normal. No rashes or lesions.  Non diaphoretic on visible skin during exam      No results found for: CHOL, CHOLX, CHLST, CHOLV, HDL, HDLP, LDL, LDLC, DLDLP, TGLX, TRIGL, TRIGP, CHHD, CHHDX  No results found for: NA, K, CL, CO2, AGAP, GLU, BUN, CREA, BUCR, GFRAA, GFRNA, CA, GFRAA   Wt Readings from Last 3 Encounters:   06/05/19 162 lb (73.5 kg)   03/05/19 169 lb (76.7 kg)   10/30/18 167 lb 6.4 oz (75.9 kg)      BP Readings from Last 3 Encounters:   11/22/19 100/81   06/05/19 128/81   03/05/19 120/81        Current Outpatient Medications   Medication Sig    loratadine (Claritin) 10 mg tablet Take 10 mg by mouth.  b complex vitamins (B COMPLEX 1) tablet Take 1 Tab by mouth daily.  vitamin e (E GEMS) 100 unit capsule Take  by mouth daily.  butalbital-acetaminophen-caffeine (FIORICET, ESGIC) -40 mg per tablet Take 1 Tab by mouth every four (4) hours as needed for Pain.  augmented betamethasone dipropionate (DIPROLENE-AF) 0.05 % ointment Apply  to affected area two (2) times a day.  ondansetron (ZOFRAN ODT) 8 mg disintegrating tablet DISSOLVE 1 TABLET BY MOUTH EVERY 6 HOURS AS NEEDED FOR NAUSEA    fluticasone propionate (FLONASE) 50 mcg/actuation nasal spray USE 2 SPRAYS IN BOTH NOSTRILS DAILY    Biotin 2,500 mcg cap Take  by mouth.  cholecalciferol (VITAMIN D3) 2,000 unit cap capsule Take  by mouth two (2) times a day.  multivitamin, tx-iron-ca-min (THERA-M W/ IRON) 9 mg iron-400 mcg tab tablet Take 1 Tab by mouth daily.  cyclobenzaprine (FLEXERIL) 10 mg tablet Take 1 Tab by mouth three (3) times daily as needed for Muscle Spasm(s).  diclofenac EC (VOLTAREN) 75 mg EC tablet TAKE 1 TAB BY MOUTH TWO (2) TIMES A DAY.  diphenhydrAMINE (BENADRYL) 25 mg capsule Take 25 mg by mouth every six (6) hours as needed.  melatonin tab tablet Take  by mouth nightly.  cetirizine (ZYRTEC) 10 mg tablet Take  by mouth. No current facility-administered medications for this visit. Impression see above.             VIRTUAL VISIT DOCUMENTATION     Pursuant to the emergency declaration under the [facility] waiver authority and the [employer] Act, this Virtual  Visit was conducted, with patient's consent, to reduce the patient's risk of exposure to COVID-19 and provide continuity of care for an established patient. Services were provided through a video synchronous discussion virtually to substitute for in-person clinic visit. We discussed the expected course, resolution and complications of the diagnosis(es) in detail. Medication risks, benefits, costs, interactions, and alternatives were discussed as indicated. I advised her to contact the office if her condition worsens, changes or fails to improve as anticipated. She expressed understanding with the diagnosis(es) and plan    I have reviewed the nurses notes, vitals, problem list, allergy list, medical history, family, social history and medications. FOLLOW-UP            Patient was made aware and verbalized understanding that an appointment will be scheduled for them for a virtual visit and/or office visit within the above time frame. Patient understanding his/her responsibility to call and change time/date if he/she so chooses. MD Torri ElkinsCHI St[facility].  [address] [place] [address]  [phone] / [phone] Fax  [phone] / [phone] Fax          This visit was conducted using [employer] services.

## 2020-04-24 ENCOUNTER — TELEPHONE (OUTPATIENT)
Dept: CARDIOLOGY CLINIC | Age: 50
End: 2020-04-24

## 2020-04-30 ENCOUNTER — TELEPHONE (OUTPATIENT)
Dept: CARDIOLOGY CLINIC | Age: 50
End: 2020-04-30

## 2020-04-30 NOTE — TELEPHONE ENCOUNTER
----- Message from Sandra Turcios MD sent at 4/29/2020  6:43 PM EDT -----  Let patient know stress nuclear is normal, makes CAD unlikely  Try 6 weeks of OTC GERD med like Prilosec OTC   If currently having more chest tightness or tachycardia then can get 7 day loop monitor  No future appointments.  Otherwise fu PRN

## 2020-09-11 ENCOUNTER — OFFICE VISIT (OUTPATIENT)
Dept: INTERNAL MEDICINE CLINIC | Age: 50
End: 2020-09-11
Payer: COMMERCIAL

## 2020-09-11 VITALS
HEART RATE: 91 BPM | TEMPERATURE: 97.8 F | WEIGHT: 171 LBS | RESPIRATION RATE: 16 BRPM | DIASTOLIC BLOOD PRESSURE: 70 MMHG | SYSTOLIC BLOOD PRESSURE: 110 MMHG | HEIGHT: 62 IN | OXYGEN SATURATION: 98 % | BODY MASS INDEX: 31.47 KG/M2

## 2020-09-11 DIAGNOSIS — F51.01 PRIMARY INSOMNIA: ICD-10-CM

## 2020-09-11 DIAGNOSIS — I83.813 VARICOSE VEINS OF BOTH LOWER EXTREMITIES WITH PAIN: Primary | ICD-10-CM

## 2020-09-11 PROCEDURE — 99214 OFFICE O/P EST MOD 30 MIN: CPT | Performed by: INTERNAL MEDICINE

## 2020-09-11 RX ORDER — ZOLPIDEM TARTRATE 5 MG/1
5 TABLET ORAL
Qty: 30 TAB | Refills: 0 | Status: CANCELLED | OUTPATIENT
Start: 2020-09-11

## 2020-09-11 NOTE — PROGRESS NOTES
Acute Care Note    Fayetta Peabody is 44 y.o. female. she presents for evaluation of Leg Pain (ache (both) off and on)    The patient reports having episodic leg pains. These have been present off and on for over a year. She has recently attempted to increase her exercise regimen and had has more pain recently. We discussed that she may need to gradually move into exercise. She also has some venous varicosity that could contribute to her pain. She also has ongoing issues with insomnia. Asking for refills of ambien. Prior to Admission medications    Medication Sig Start Date End Date Taking? Authorizing Provider   loratadine (Claritin) 10 mg tablet Take 10 mg by mouth. Yes Provider, Historical   butalbital-acetaminophen-caffeine (FIORICET, ESGIC) -40 mg per tablet Take 1 Tab by mouth every four (4) hours as needed for Pain. 11/22/19  Yes Isabela Rivas MD   augmented betamethasone dipropionate (DIPROLENE-AF) 0.05 % ointment Apply  to affected area two (2) times a day. 11/22/19  Yes Isabela Rivas MD   fluticasone propionate [facility]) 50 mcg/actuation nasal spray USE 2 SPRAYS IN BOTH NOSTRILS DAILY 8/13/20   Isabela Rivas MD   b complex vitamins (B COMPLEX 1) tablet Take 1 Tab by mouth daily. Provider, Historical   vitamin e (E GEMS) 100 unit capsule Take  by mouth daily. Provider, Historical   ondansetron (ZOFRAN ODT) 8 mg disintegrating tablet DISSOLVE 1 TABLET BY MOUTH EVERY 6 HOURS AS NEEDED FOR NAUSEA 8/28/19   Isabela Rivas MD   Biotin 2,500 mcg cap Take  by mouth. Provider, Historical   cholecalciferol (VITAMIN D3) 2,000 unit cap capsule Take  by mouth two (2) times a day. Provider, Historical   multivitamin, tx-iron-ca-min (THERA-M W/ IRON) 9 mg iron-400 mcg tab tablet Take 1 Tab by mouth daily. Provider, Historical   cyclobenzaprine (FLEXERIL) 10 mg tablet Take 1 Tab by mouth three (3) times daily as needed for Muscle Spasm(s).  6/5/19   Madeleine Cabello, Berto Iqbal MD   diclofenac EC (VOLTAREN) 75 mg EC tablet TAKE 1 TAB BY MOUTH TWO (2) TIMES A DAY. 9/24/18   Gabbie Selby MD   diphenhydrAMINE (BENADRYL) 25 mg capsule Take 25 mg by mouth every six (6) hours as needed. Provider, Historical   melatonin tab tablet Take  by mouth nightly. Provider, Historical         Patient Active Problem List   Diagnosis Code    Depression F32.9    Migraines G43.909    HTN (hypertension) I10    Obesity E66.9         Review of Systems   Constitutional: Negative. Cardiovascular: Negative. Musculoskeletal: Positive for joint pain. Visit Vitals  /70 (BP 1 Location: Left arm, BP Patient Position: Sitting)   Pulse 91   Temp 97.8 °F (36.6 °C) (Temporal)   Resp 16   Ht 5' 2\" (1.575 m)   Wt 171 lb (77.6 kg)   SpO2 98%   BMI 31.28 kg/m²       Physical Exam  Constitutional:       Appearance: Normal appearance. Cardiovascular:      Rate and Rhythm: Normal rate and regular rhythm. Pulses: Normal pulses. Heart sounds: Normal heart sounds. Musculoskeletal:      Comments: Noted venous varicosities   Neurological:      Mental Status: She is alert. ASSESSMENT/PLAN  Diagnoses and all orders for this visit:    1. Varicose veins of both lower extremities with pain - This is a likely source of her pain. We will continue to follow this. Advised use of compression hose when working. 2. Primary insomnia - Ambien prn         Advised the patient to call back or return to office if symptoms worsen/change/persist.   Discussed expected course/resolution/complications of diagnosis in detail with patient. Medication risks/benefits/costs/interactions/alternatives discussed with patient. The patient was given an after visit summary which includes diagnoses, current medications, & vitals. They expressed understanding with the diagnosis and plan.

## 2020-09-11 NOTE — PROGRESS NOTES
Chief Complaint   Patient presents with    Leg Pain     ache (both) off and on     Reviewed record in preparation for visit and have obtained necessary documentation. Identified pt with two pt identifiers(name and ). Health Maintenance Due   Topic    DTaP/Tdap/Td series (1 - Tdap)    PAP AKA CERVICAL CYTOLOGY     Shingrix Vaccine Age 50> (1 of 2)    FOBT Q1Y Age 54-65     Breast Cancer Screen Mammogram     Flu Vaccine (1)         Chief Complaint   Patient presents with    Leg Pain     ache (both) off and on        Wt Readings from Last 3 Encounters:   20 171 lb (77.6 kg)   20 162 lb (73.5 kg)   19 162 lb (73.5 kg)     Temp Readings from Last 3 Encounters:   20 97.8 °F (36.6 °C) (Temporal)   19 98.3 °F (36.8 °C) (Oral)   19 99.2 °F (37.3 °C) (Oral)     BP Readings from Last 3 Encounters:   20 110/70   19 100/81   19 128/81     Pulse Readings from Last 3 Encounters:   20 91   19 100   19 73           Learning Assessment:  :     Learning Assessment 2014   PRIMARY LEARNER Patient   HIGHEST LEVEL OF EDUCATION - PRIMARY LEARNER  4 YEARS OF COLLEGE   BARRIERS PRIMARY LEARNER NONE   CO-LEARNER CAREGIVER No   PRIMARY LANGUAGE ENGLISH   LEARNER PREFERENCE PRIMARY LISTENING   ANSWERED BY patient   RELATIONSHIP SELF       Depression Screening:  :     3 most recent PHQ Screens 2014   Little interest or pleasure in doing things Not at all   Feeling down, depressed, irritable, or hopeless Not at all   Total Score PHQ 2 0       Fall Risk Assessment:  :     No flowsheet data found. Abuse Screening:  :     No flowsheet data found. Coordination of Care Questionnaire:  :     1) Have you been to an emergency room, urgent care clinic since your last visit? no   Hospitalized since your last visit? no             2) Have you seen or consulted any other health care providers outside of [address] [place] [address] since your last visit? no  (Include any pap smears or colon screenings in this section.)    3) Do you have an Advance Directive on file? no    4) Are you interested in receiving information on Advance Directives? NO      Patient is accompanied by self I have received verbal consent from Jessie Farley to discuss any/all medical information while they are present in the room. Reviewed record  In preparation for visit and have obtained necessary documentation.

## 2020-09-24 DIAGNOSIS — F51.01 PRIMARY INSOMNIA: Primary | ICD-10-CM

## 2020-09-24 NOTE — TELEPHONE ENCOUNTER
Patient states Dr. Vibha Loaiza was supposed to have sent in a prescription for Ambien, but did not.       [facility] [address]

## 2020-09-25 RX ORDER — ZOLPIDEM TARTRATE 5 MG/1
5 TABLET ORAL
Qty: 30 TAB | Refills: 1 | Status: SHIPPED | OUTPATIENT
Start: 2020-09-25 | End: 2021-01-29

## 2020-09-25 NOTE — TELEPHONE ENCOUNTER
Pt called again about script. She is not sleeping and has been waiting for this since her 9/11/20 appt.

## 2020-11-06 RX ORDER — FLUTICASONE PROPIONATE 50 MCG
SPRAY, SUSPENSION (ML) NASAL
Qty: 1 BOTTLE | Refills: 1 | Status: SHIPPED | OUTPATIENT
Start: 2020-11-06 | End: 2020-12-10

## 2020-12-07 DIAGNOSIS — L30.9 ECZEMA, UNSPECIFIED TYPE: ICD-10-CM

## 2020-12-10 RX ORDER — BETAMETHASONE DIPROPIONATE 0.5 MG/G
OINTMENT TOPICAL
Qty: 15 G | Refills: 0 | Status: SHIPPED | OUTPATIENT
Start: 2020-12-10 | End: 2021-01-03

## 2020-12-10 RX ORDER — FLUTICASONE PROPIONATE 50 MCG
SPRAY, SUSPENSION (ML) NASAL
Qty: 1 BOTTLE | Refills: 1 | Status: SHIPPED | OUTPATIENT
Start: 2020-12-10 | End: 2021-01-05

## 2020-12-16 ENCOUNTER — OFFICE VISIT (OUTPATIENT)
Dept: INTERNAL MEDICINE CLINIC | Age: 50
End: 2020-12-16
Payer: COMMERCIAL

## 2020-12-16 VITALS
OXYGEN SATURATION: 99 % | RESPIRATION RATE: 16 BRPM | SYSTOLIC BLOOD PRESSURE: 110 MMHG | HEART RATE: 81 BPM | TEMPERATURE: 97 F | DIASTOLIC BLOOD PRESSURE: 70 MMHG

## 2020-12-16 DIAGNOSIS — R10.32 LEFT LOWER QUADRANT PAIN: Primary | ICD-10-CM

## 2020-12-16 PROCEDURE — 99214 OFFICE O/P EST MOD 30 MIN: CPT | Performed by: INTERNAL MEDICINE

## 2020-12-16 NOTE — PROGRESS NOTES
Chief Complaint   Patient presents with    Abdominal Pain     LLQ pain     Reviewed record in preparation for visit and have obtained necessary documentation. Identified pt with two pt identifiers(name and ). Health Maintenance Due   Topic    DTaP/Tdap/Td series (1 - Tdap)    PAP AKA CERVICAL CYTOLOGY     Shingrix Vaccine Age 49> (1 of 2)    Colorectal Cancer Screening Combo     Breast Cancer Screen Mammogram     Flu Vaccine (1)         Chief Complaint   Patient presents with    Abdominal Pain     LLQ pain        Wt Readings from Last 3 Encounters:   20 171 lb (77.6 kg)   20 162 lb (73.5 kg)   19 162 lb (73.5 kg)     Temp Readings from Last 3 Encounters:   20 97 °F (36.1 °C) (Temporal)   20 97.8 °F (36.6 °C) (Temporal)   19 98.3 °F (36.8 °C) (Oral)     BP Readings from Last 3 Encounters:   20 110/70   20 110/70   19 100/81     Pulse Readings from Last 3 Encounters:   20 81   20 91   19 100           Learning Assessment:  :     Learning Assessment 2014   PRIMARY LEARNER Patient   HIGHEST LEVEL OF EDUCATION - PRIMARY LEARNER  4 YEARS OF COLLEGE   BARRIERS PRIMARY LEARNER NONE   CO-LEARNER CAREGIVER No   PRIMARY LANGUAGE ENGLISH   LEARNER PREFERENCE PRIMARY LISTENING   ANSWERED BY patient   RELATIONSHIP SELF       Depression Screening:  :     3 most recent PHQ Screens 2014   Little interest or pleasure in doing things Not at all   Feeling down, depressed, irritable, or hopeless Not at all   Total Score PHQ 2 0       Fall Risk Assessment:  :     No flowsheet data found. Abuse Screening:  :     No flowsheet data found. Coordination of Care Questionnaire:  :     1) Have you been to an emergency room, urgent care clinic since your last visit?no noHospitalized since your last visit? no             2) Have you seen or consulted any other health care providers outside of [address] [place] [address] since your last visit? no  (Include any pap smears or colon screenings in this section.)    3) Do you have an Advance Directive on file? no    4) Are you interested in receiving information on Advance Directives? NO      Patient is accompanied by self I have received verbal consent from Ciara Cedillo to discuss any/all medical information while they are present in the room. Reviewed record  In preparation for visit and have obtained necessary documentation.

## 2020-12-30 NOTE — PROGRESS NOTES
Acute Care Note    Jose David Monroy is 44 y.o. female. she presents for evaluation of Abdominal Pain (LLQ pain)    Abdominal Pain  [facility] is here to talk about abdominal pain. This is a new problem and symptoms began 2 weeks ago and have stayed. Pain is located in LLQ and described as aching. She symptoms are aggravated by movement and pressure. Symptoms improve with recumbency. She does have some history of constipation. She notes that this has not been severe lately. Prior to Admission medications    Medication Sig Start Date End Date Taking? Authorizing Provider   fluticasone propionate (FLONASE) 50 mcg/actuation nasal spray INSTILL 2 SPRAYS IN BOTH NOSTRIL DAILY 12/10/20  Yes Karlos Montejo MD   augmented betamethasone dipropionate (DIPROLENE-AF) 0.05 % ointment APPLY TO AFFECTED AREA TWICE A DAY 12/10/20  Yes Karlos Montejo MD   zolpidem (AMBIEN) 5 mg tablet Take 1 Tab by mouth nightly as needed for Sleep. Max Daily Amount: 5 mg. 9/25/20  Yes Karlos Montejo MD   loratadine (Claritin) 10 mg tablet Take 10 mg by mouth. Yes Provider, Historical   b complex vitamins (B COMPLEX 1) tablet Take 1 Tab by mouth daily. Yes Provider, Historical   butalbital-acetaminophen-caffeine (FIORICET, ESGIC) -40 mg per tablet Take 1 Tab by mouth every four (4) hours as needed for Pain. 11/22/19  Yes Karlos Montejo MD   Biotin 2,500 mcg cap Take  by mouth. Yes Provider, Historical   multivitamin, tx-iron-ca-min (THERA-M W/ IRON) 9 mg iron-400 mcg tab tablet Take 1 Tab by mouth daily. Yes Provider, Historical   diphenhydrAMINE (BENADRYL) 25 mg capsule Take 25 mg by mouth every six (6) hours as needed. Yes Provider, Historical   vitamin e (E GEMS) 100 unit capsule Take  by mouth daily.     Provider, Historical   ondansetron (ZOFRAN ODT) 8 mg disintegrating tablet DISSOLVE 1 TABLET BY MOUTH EVERY 6 HOURS AS NEEDED FOR NAUSEA 8/28/19   Karlos Montejo MD cholecalciferol (VITAMIN D3) 2,000 unit cap capsule Take  by mouth two (2) times a day. Provider, Historical   cyclobenzaprine (FLEXERIL) 10 mg tablet Take 1 Tab by mouth three (3) times daily as needed for Muscle Spasm(s). 6/5/19   Abril Miranda MD   diclofenac EC (VOLTAREN) 75 mg EC tablet TAKE 1 TAB BY MOUTH TWO (2) TIMES A DAY. 9/24/18   Abril Miranda MD   melatonin tab tablet Take  by mouth nightly. Provider, Historical         Patient Active Problem List   Diagnosis Code    Depression F32.9    Migraines G43.909    HTN (hypertension) I10    Obesity E66.9         Review of Systems   Constitutional: Negative. Respiratory: Negative. Cardiovascular: Negative. Genitourinary: Negative. Visit Vitals  /70 (BP 1 Location: Left arm, BP Patient Position: Sitting)   Pulse 81   Temp 97 °F (36.1 °C) (Temporal)   Resp 16   SpO2 99%       Physical Exam  Constitutional:       Appearance: Normal appearance. Cardiovascular:      Pulses: Normal pulses. Heart sounds: Normal heart sounds. Abdominal:      General: Abdomen is flat. Tenderness: There is abdominal tenderness (mild at the left lower quadrant). Neurological:      Mental Status: She is alert. ASSESSMENT/PLAN  Diagnoses and all orders for this visit:    1. Left lower quadrant pain  -     XR ABD (KUB); Future         Advised the patient to call back or return to office if symptoms worsen/change/persist.   Discussed expected course/resolution/complications of diagnosis in detail with patient. Medication risks/benefits/costs/interactions/alternatives discussed with patient. The patient was given an after visit summary which includes diagnoses, current medications, & vitals. They expressed understanding with the diagnosis and plan.

## 2021-01-01 DIAGNOSIS — M54.50 LUMBAR PAIN: ICD-10-CM

## 2021-01-01 DIAGNOSIS — R11.0 NAUSEA: ICD-10-CM

## 2021-01-01 DIAGNOSIS — G43.009 MIGRAINE WITHOUT AURA AND WITHOUT STATUS MIGRAINOSUS, NOT INTRACTABLE: ICD-10-CM

## 2021-01-01 DIAGNOSIS — L30.9 ECZEMA, UNSPECIFIED TYPE: ICD-10-CM

## 2021-01-03 RX ORDER — BETAMETHASONE DIPROPIONATE 0.5 MG/G
OINTMENT TOPICAL
Qty: 15 G | Refills: 0 | Status: SHIPPED | OUTPATIENT
Start: 2021-01-03 | End: 2021-06-07

## 2021-01-03 RX ORDER — BUTALBITAL, ACETAMINOPHEN AND CAFFEINE 50; 325; 40 MG/1; MG/1; MG/1
TABLET ORAL
Qty: 30 TAB | Refills: 1 | Status: SHIPPED | OUTPATIENT
Start: 2021-01-03 | End: 2021-07-21

## 2021-01-03 RX ORDER — CYCLOBENZAPRINE HCL 10 MG
TABLET ORAL
Qty: 30 TAB | Refills: 0 | Status: SHIPPED | OUTPATIENT
Start: 2021-01-03

## 2021-01-03 RX ORDER — ONDANSETRON 8 MG/1
TABLET, ORALLY DISINTEGRATING ORAL
Qty: 30 TAB | Refills: 0 | Status: SHIPPED | OUTPATIENT
Start: 2021-01-03

## 2021-01-05 RX ORDER — FLUTICASONE PROPIONATE 50 MCG
SPRAY, SUSPENSION (ML) NASAL
Qty: 1 BOTTLE | Refills: 1 | Status: SHIPPED | OUTPATIENT
Start: 2021-01-05 | End: 2021-02-02

## 2021-01-28 DIAGNOSIS — F51.01 PRIMARY INSOMNIA: ICD-10-CM

## 2021-01-29 RX ORDER — ZOLPIDEM TARTRATE 5 MG/1
TABLET ORAL
Qty: 30 TAB | Refills: 1 | Status: SHIPPED | OUTPATIENT
Start: 2021-01-29 | End: 2021-05-16

## 2021-02-02 RX ORDER — FLUTICASONE PROPIONATE 50 MCG
SPRAY, SUSPENSION (ML) NASAL
Qty: 1 BOTTLE | Refills: 1 | Status: SHIPPED | OUTPATIENT
Start: 2021-02-02 | End: 2021-02-08 | Stop reason: SDUPTHER

## 2021-02-04 RX ORDER — FLUTICASONE PROPIONATE 50 MCG
SPRAY, SUSPENSION (ML) NASAL
Qty: 1 BOTTLE | Refills: 1 | OUTPATIENT
Start: 2021-02-04

## 2021-02-04 NOTE — TELEPHONE ENCOUNTER
Requested Prescriptions     Pending Prescriptions Disp Refills    fluticasone propionate (FLONASE) 50 mcg/actuation nasal spray 1 Bottle 1         Requested Quantity : 48.0          [facility]/pharmacy #[id number]- [place], [place] - [address]

## 2021-03-11 DIAGNOSIS — R10.32 LEFT LOWER QUADRANT PAIN: ICD-10-CM

## 2021-03-12 ENCOUNTER — OFFICE VISIT (OUTPATIENT)
Dept: INTERNAL MEDICINE CLINIC | Age: 51
End: 2021-03-12
Payer: COMMERCIAL

## 2021-03-12 VITALS
TEMPERATURE: 97.3 F | HEART RATE: 76 BPM | OXYGEN SATURATION: 98 % | DIASTOLIC BLOOD PRESSURE: 70 MMHG | SYSTOLIC BLOOD PRESSURE: 100 MMHG | RESPIRATION RATE: 16 BRPM

## 2021-03-12 DIAGNOSIS — M54.31 SCIATICA OF RIGHT SIDE: Primary | ICD-10-CM

## 2021-03-12 PROCEDURE — 99214 OFFICE O/P EST MOD 30 MIN: CPT | Performed by: INTERNAL MEDICINE

## 2021-03-12 RX ORDER — METHYLPREDNISOLONE 4 MG/1
4 TABLET ORAL
Qty: 1 DOSE PACK | Refills: 0 | Status: SHIPPED | OUTPATIENT
Start: 2021-03-12 | End: 2021-10-28

## 2021-03-12 NOTE — PATIENT INSTRUCTIONS
Sciatica: Exercises Introduction Here are some examples of typical rehabilitation exercises for your condition. Start each exercise slowly. Ease off the exercise if you start to have pain. Your doctor or physical therapist will tell you when you can start these exercises and which ones will work best for you. When you are not being active, find a comfortable position for rest. Some people are comfortable on the floor or a medium-firm bed with a small pillow under their head and another under their knees. Some people prefer to lie on their side with a pillow between their knees. Don't stay in one position for too long. Take short walks (10 to 20 minutes) every 2 to 3 hours. Avoid slopes, hills, and stairs until you feel better. Walk only distances you can manage without pain, especially leg pain. How to do the exercises Back stretches 1. Get down on your hands and knees on the floor. 2. Relax your head and allow it to droop. Round your back up toward the ceiling until you feel a nice stretch in your upper, middle, and lower back. Hold this stretch for as long as it feels comfortable, or about 15 to 30 seconds. 3. Return to the starting position with a flat back while you are on your hands and knees. 4. Let your back sway by pressing your stomach toward the floor. Lift your buttocks toward the ceiling. 5. Hold this position for 15 to 30 seconds. 6. Repeat 2 to 4 times. Follow-up care is a key part of your treatment and safety. Be sure to make and go to all appointments, and call your doctor if you are having problems. It's also a good idea to know your test results and keep a list of the medicines you take. Where can you learn more? Go to http://www.gray.com/ Enter X199 in the search box to learn more about \"Sciatica: Exercises. \" Current as of: March 2, 2020               Content Version: 12.6 © 7936-9791 [employer].   
Care instructions adapted under license by [address] (which disclaims liability or warranty for this information). If you have questions about a medical condition or this instruction, always ask your healthcare professional. [address] any warranty or liability for your use of this information.

## 2021-03-22 NOTE — PROGRESS NOTES
Acute Care Note    Bette Rapp is 44 y.o. female. she presents for evaluation of Back Pain    Back Pain  Patient presents for presents evaluation of low back problems. Symptoms have been present for several days and include stiffness in the lower back. Initial inciting event: none. Symptoms are worst: at any time. Alleviating factors identifiable by patient are recumbency. Exacerbating factors identifiable by patient are bending forwards. Treatments so far initiated by patient: none Previous lower back problems: she has had previous self-limited issues with her back in the past. Previous workup: none. Previous treatments: OTC medications. Prior to Admission medications    Medication Sig Start Date End Date Taking? Authorizing Provider   methylPREDNISolone (MEDROL DOSEPACK) 4 mg tablet Take 1 Tab by mouth Specific Days and Specific Times. 3/12/21  Yes Xena Shine MD   fluticasone propionate (FLONASE) 50 mcg/actuation nasal spray INSTILL 2 SPRAYS IN BOTH NOSTRIL DAILY 2/9/21  Yes Xena Shine MD   zolpidem (AMBIEN) 5 mg tablet TAKE 1 TABLET BY MOUTH NIGHTLY AS NEEDED FOR SLEEP 1/29/21  Yes Xena Shine MD   cyclobenzaprine (FLEXERIL) 10 mg tablet TAKE 1 TABLET BY MOUTH THREE TIMES A DAY AS NEEDED FOR MUSCLE SPASMS 1/3/21  Yes Xena Shine MD   butalbital-acetaminophen-caffeine (FIORICET, ESGIC) -40 mg per tablet TAKE 1 TABLET BY MOUTH EVERY 4 HOURS AS NEEDED FOR HEADACHE 1/3/21  Yes Xena Shine MD   augmented betamethasone dipropionate (DIPROLENE-AF) 0.05 % ointment APPLY TO AFFECTED AREA TWICE A DAY 1/3/21  Yes Xena Shine MD   loratadine (Claritin) 10 mg tablet Take 10 mg by mouth. Yes Provider, Historical   Biotin 2,500 mcg cap Take  by mouth. Yes Provider, Historical   cholecalciferol (VITAMIN D3) 2,000 unit cap capsule Take  by mouth two (2) times a day.    Yes Provider, Historical   multivitamin, tx-iron-ca-min (THERA-M W/ IRON) 9 mg iron-400 mcg tab tablet Take 1 Tab by mouth daily. Yes Provider, Historical   diclofenac EC (VOLTAREN) 75 mg EC tablet TAKE 1 TAB BY MOUTH TWO (2) TIMES A DAY. 9/24/18  Yes Delano Guo MD   diphenhydrAMINE (BENADRYL) 25 mg capsule Take 25 mg by mouth every six (6) hours as needed. Yes Provider, Historical   ondansetron (ZOFRAN ODT) 8 mg disintegrating tablet DISSOLVE 1 TABLET BY MOUTH EVERY 6 HOURS AS NEEDED FOR NAUSEA 1/3/21   Delano Guo MD   b complex vitamins (B COMPLEX 1) tablet Take 1 Tab by mouth daily. Provider, Historical   vitamin e (E GEMS) 100 unit capsule Take  by mouth daily. Provider, Historical   melatonin tab tablet Take  by mouth nightly. Provider, Historical         Patient Active Problem List   Diagnosis Code    Depression F32.9    Migraines G43.909    HTN (hypertension) I10    Obesity E66.9         Review of Systems   Constitutional: Negative. Respiratory: Negative. Cardiovascular: Negative. Genitourinary: Negative. Visit Vitals  /70 (BP 1 Location: Left arm, BP Patient Position: Sitting, BP Cuff Size: Large adult)   Pulse 76   Temp 97.3 °F (36.3 °C) (Temporal)   Resp 16   SpO2 98%       Physical Exam  Constitutional:       Appearance: She is well-developed. Cardiovascular:      Rate and Rhythm: Normal rate and regular rhythm. Pulmonary:      Effort: Pulmonary effort is normal.      Breath sounds: Normal breath sounds. ASSESSMENT/PLAN  Diagnoses and all orders for this visit:    1. Sciatica of right side  -     methylPREDNISolone (MEDROL DOSEPACK) 4 mg tablet; Take 1 Tab by mouth Specific Days and Specific Times. Advised the patient to call back or return to office if symptoms worsen/change/persist.   Discussed expected course/resolution/complications of diagnosis in detail with patient. Medication risks/benefits/costs/interactions/alternatives discussed with patient.    The patient was given an after visit summary which includes diagnoses, current medications, & vitals. They expressed understanding with the diagnosis and plan.

## 2021-04-13 ENCOUNTER — OFFICE VISIT (OUTPATIENT)
Dept: INTERNAL MEDICINE CLINIC | Age: 51
End: 2021-04-13
Payer: COMMERCIAL

## 2021-04-13 VITALS
TEMPERATURE: 97.3 F | HEART RATE: 80 BPM | BODY MASS INDEX: 31.28 KG/M2 | RESPIRATION RATE: 16 BRPM | DIASTOLIC BLOOD PRESSURE: 70 MMHG | OXYGEN SATURATION: 99 % | HEIGHT: 62 IN | SYSTOLIC BLOOD PRESSURE: 120 MMHG

## 2021-04-13 DIAGNOSIS — M54.50 LUMBAR PAIN: ICD-10-CM

## 2021-04-13 DIAGNOSIS — M54.31 SCIATICA OF RIGHT SIDE: Primary | ICD-10-CM

## 2021-04-13 PROCEDURE — 99214 OFFICE O/P EST MOD 30 MIN: CPT | Performed by: INTERNAL MEDICINE

## 2021-04-13 NOTE — PROGRESS NOTES
Acute Care Note    Last Reid is 44 y.o. female. she presents for evaluation of Abdominal Pain (leg)      The patient reports episodic pain which she reports to move down her right leg. This has been an ongoing complaint. She reports that this is painful during intercourse. 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 We discussed that since her last visit she has gained 10lbs. She has been using episodic anti-inflammatory medication for this. Prior to Admission medications    Medication Sig Start Date End Date Taking? Authorizing Provider   fluticasone propionate (FLONASE) 50 mcg/actuation nasal spray INSTILL 2 SPRAYS IN BOTH NOSTRIL DAILY 2/9/21  Yes Belen Muñoz MD   zolpidem (AMBIEN) 5 mg tablet TAKE 1 TABLET BY MOUTH NIGHTLY AS NEEDED FOR SLEEP 1/29/21  Yes Belen Muñoz MD   cyclobenzaprine (FLEXERIL) 10 mg tablet TAKE 1 TABLET BY MOUTH THREE TIMES A DAY AS NEEDED FOR MUSCLE SPASMS 1/3/21  Yes Belen Muñoz MD   butalbital-acetaminophen-caffeine (FIORICET, ESGIC) -40 mg per tablet TAKE 1 TABLET BY MOUTH EVERY 4 HOURS AS NEEDED FOR HEADACHE 1/3/21  Yes Belen Muñoz MD   augmented betamethasone dipropionate (DIPROLENE-AF) 0.05 % ointment APPLY TO AFFECTED AREA TWICE A DAY 1/3/21  Yes Belen Muñoz MD   ondansetron (ZOFRAN ODT) 8 mg disintegrating tablet DISSOLVE 1 TABLET BY MOUTH EVERY 6 HOURS AS NEEDED FOR NAUSEA 1/3/21  Yes Belen Muñoz MD   loratadine (Claritin) 10 mg tablet Take 10 mg by mouth. Yes Provider, Historical   b complex vitamins (B COMPLEX 1) tablet Take 1 Tab by mouth daily. Yes Provider, Historical   vitamin e (E GEMS) 100 unit capsule Take  by mouth daily. Yes Provider, Historical   Biotin 2,500 mcg cap Take  by mouth. Yes Provider, Historical   cholecalciferol (VITAMIN D3) 2,000 unit cap capsule Take  by mouth two (2) times a day.    Yes Provider, Historical   multivitamin, tx-iron-ca-min (THERA-M W/ IRON) 9 mg iron-400 mcg tab tablet Take 1 Tab by mouth daily. Yes Provider, Historical   diclofenac EC (VOLTAREN) 75 mg EC tablet TAKE 1 TAB BY MOUTH TWO (2) TIMES A DAY. 9/24/18  Yes Nazia Santo MD   diphenhydrAMINE (BENADRYL) 25 mg capsule Take 25 mg by mouth every six (6) hours as needed. Yes Provider, Historical   melatonin tab tablet Take  by mouth nightly. Yes Provider, Historical   methylPREDNISolone (MEDROL DOSEPACK) 4 mg tablet Take 1 Tab by mouth Specific Days and Specific Times. 3/12/21   Nazia Santo MD         Patient Active Problem List   Diagnosis Code    Depression F32.9    Migraines G43.909    HTN (hypertension) I10    Obesity E66.9         Review of Systems   Constitutional: Negative. Respiratory: Negative. Cardiovascular: Negative. Musculoskeletal: Negative. Visit Vitals  /70 (BP 1 Location: Right arm, BP Patient Position: Sitting, BP Cuff Size: Large adult)   Pulse 80   Temp 97.3 °F (36.3 °C) (Temporal)   Resp 16   Ht 5' 2\" (1.575 m)   SpO2 99%   BMI 31.28 kg/m²       Physical Exam  Constitutional:       Appearance: Normal appearance. Cardiovascular:      Rate and Rhythm: Normal rate and regular rhythm. Pulses: Normal pulses. Heart sounds: Normal heart sounds. Pulmonary:      Effort: Pulmonary effort is normal.      Breath sounds: Normal breath sounds. Neurological:      Mental Status: She is alert. ASSESSMENT/PLAN  Diagnoses and all orders for this visit:    1. Sciatica of right side - Discussed that she would need to try PT first.  Her sister is a therapist and she will attempt exercise prior to any orthopedic evaluation. Also she will work on weight loss. 2. Lumbar pain         Advised the patient to call back or return to office if symptoms worsen/change/persist.   Discussed expected course/resolution/complications of diagnosis in detail with patient.      Medication risks/benefits/costs/interactions/alternatives discussed with patient. The patient was given an after visit summary which includes diagnoses, current medications, & vitals. They expressed understanding with the diagnosis and plan.

## 2021-04-13 NOTE — PROGRESS NOTES
Chief Complaint   Patient presents with    Abdominal Pain     leg     Reviewed record in preparation for visit and have obtained necessary documentation. Identified pt with two pt identifiers(name and ). Health Maintenance Due   Topic    Hepatitis C Screening     COVID-19 Vaccine (1)    DTaP/Tdap/Td series (1 - Tdap)    Shingrix Vaccine Age 49> (1 of 2)    Colorectal Cancer Screening Combo          Chief Complaint   Patient presents with    Abdominal Pain     leg        Wt Readings from Last 3 Encounters:   20 171 lb (77.6 kg)   20 162 lb (73.5 kg)   19 162 lb (73.5 kg)     Temp Readings from Last 3 Encounters:   21 97.3 °F (36.3 °C) (Temporal)   21 97.3 °F (36.3 °C) (Temporal)   20 97 °F (36.1 °C) (Temporal)     BP Readings from Last 3 Encounters:   21 120/70   21 100/70   20 110/70     Pulse Readings from Last 3 Encounters:   21 80   21 76   20 81           Learning Assessment:  :     Learning Assessment 2014   PRIMARY LEARNER Patient   HIGHEST LEVEL OF EDUCATION - PRIMARY LEARNER  4 YEARS OF COLLEGE   BARRIERS PRIMARY LEARNER NONE   CO-LEARNER CAREGIVER No   PRIMARY LANGUAGE ENGLISH   LEARNER PREFERENCE PRIMARY LISTENING   ANSWERED BY patient   RELATIONSHIP SELF       Depression Screening:  :     3 most recent PHQ Screens 3/12/2021   Little interest or pleasure in doing things Not at all   Feeling down, depressed, irritable, or hopeless Not at all   Total Score PHQ 2 0       Fall Risk Assessment:  :     Fall Risk Assessment, last 12 mths 3/12/2021   Able to walk? No       Abuse Screening:  :     No flowsheet data found. Coordination of Care Questionnaire:  :     1) Have you been to an emergency room, urgent care clinic since your last visit? no   Hospitalized since your last visit? no             2) Have you seen or consulted any other health care providers outside of [address] [place] [address] since your last visit? no  (Include any pap smears or colon screenings in this section.)    3) Do you have an Advance Directive on file? no    4) Are you interested in receiving information on Advance Directives? NO      Patient is accompanied by self I have received verbal consent from Earnestine Avendano to discuss any/all medical information while they are present in the room. Reviewed record  In preparation for visit and have obtained necessary documentation.

## 2021-05-15 DIAGNOSIS — F51.01 PRIMARY INSOMNIA: ICD-10-CM

## 2021-05-16 RX ORDER — ZOLPIDEM TARTRATE 5 MG/1
TABLET ORAL
Qty: 30 TAB | Refills: 1 | Status: SHIPPED | OUTPATIENT
Start: 2021-05-16 | End: 2021-08-20

## 2021-06-05 DIAGNOSIS — L30.9 ECZEMA, UNSPECIFIED TYPE: ICD-10-CM

## 2021-06-07 RX ORDER — BETAMETHASONE DIPROPIONATE 0.5 MG/G
OINTMENT TOPICAL
Qty: 15 G | Refills: 0 | Status: SHIPPED | OUTPATIENT
Start: 2021-06-07

## 2021-07-06 ENCOUNTER — TELEPHONE (OUTPATIENT)
Dept: INTERNAL MEDICINE CLINIC | Age: 51
End: 2021-07-06

## 2021-07-06 NOTE — TELEPHONE ENCOUNTER
Received phone call from patient stating her sciatica is worse. She has been in the bed for three days. It takes 2-3 minutes to get out the bed. She is requesting a steroid injection. Patient notified steroid injection are not done in our office. Referred to ortho on call.

## 2021-07-19 DIAGNOSIS — G43.009 MIGRAINE WITHOUT AURA AND WITHOUT STATUS MIGRAINOSUS, NOT INTRACTABLE: ICD-10-CM

## 2021-07-21 RX ORDER — BUTALBITAL, ACETAMINOPHEN AND CAFFEINE 50; 325; 40 MG/1; MG/1; MG/1
TABLET ORAL
Qty: 30 TABLET | Refills: 1 | Status: SHIPPED | OUTPATIENT
Start: 2021-07-21 | End: 2021-12-20

## 2021-08-19 DIAGNOSIS — F51.01 PRIMARY INSOMNIA: ICD-10-CM

## 2021-08-20 RX ORDER — ZOLPIDEM TARTRATE 5 MG/1
TABLET ORAL
Qty: 30 TABLET | Refills: 1 | Status: SHIPPED | OUTPATIENT
Start: 2021-08-20 | End: 2021-11-09

## 2021-10-05 ENCOUNTER — OFFICE VISIT (OUTPATIENT)
Dept: INTERNAL MEDICINE CLINIC | Age: 51
End: 2021-10-05
Payer: COMMERCIAL

## 2021-10-05 VITALS
TEMPERATURE: 98.9 F | HEART RATE: 100 BPM | SYSTOLIC BLOOD PRESSURE: 120 MMHG | DIASTOLIC BLOOD PRESSURE: 70 MMHG | RESPIRATION RATE: 16 BRPM | OXYGEN SATURATION: 97 %

## 2021-10-05 DIAGNOSIS — K21.9 GASTROESOPHAGEAL REFLUX DISEASE, UNSPECIFIED WHETHER ESOPHAGITIS PRESENT: Primary | ICD-10-CM

## 2021-10-05 PROCEDURE — 99213 OFFICE O/P EST LOW 20 MIN: CPT | Performed by: INTERNAL MEDICINE

## 2021-10-05 RX ORDER — IBUPROFEN 800 MG/1
TABLET ORAL
COMMUNITY
Start: 2021-10-02

## 2021-10-05 NOTE — PATIENT INSTRUCTIONS
Please try Prilosec OTC PRN. Also minimize intake of GERD causing foods. Follow up if not improving in the next two weeks.

## 2021-10-05 NOTE — PROGRESS NOTES
Acute Care Note    Sindi Garcia is 46 y.o. female. she presents for evaluation of Heartburn (burping, nausea, heart burn x 1 wk)     GI Review  Patient complains of GERD. Her symptoms include belching and eructation, heartburn and upper abdominal discomfort. She denies choking on food and odynophagia. She has identified the following triggers: ETOH, spicy foods. Medical therapy currently involves none. Prior to Admission medications    Medication Sig Start Date End Date Taking? Authorizing Provider   zolpidem (AMBIEN) 5 mg tablet TAKE 1 TABLET BY MOUTH AT BEDTIME AS NEEDED FOR SLEEP 8/20/21  Yes Carol Ross MD   butalbital-acetaminophen-caffeine (FIORICET, ESGIC) -40 mg per tablet TAKE 1 TABLET BY MOUTH EVERY 4 HOURS AS NEEDED FOR HEADACHE 7/21/21  Yes Carol Ross MD   augmented betamethasone dipropionate (DIPROLENE-AF) 0.05 % ointment APPLY TO AFFECTED AREA TWICE A DAY 6/7/21  Yes Carol Ross MD   fluticasone propionate (FLONASE) 50 mcg/actuation nasal spray INSTILL 2 SPRAYS IN BOTH NOSTRIL DAILY 2/9/21  Yes Carol Ross MD   ondansetron (ZOFRAN ODT) 8 mg disintegrating tablet DISSOLVE 1 TABLET BY MOUTH EVERY 6 HOURS AS NEEDED FOR NAUSEA 1/3/21  Yes Carol Ross MD   b complex vitamins (B COMPLEX 1) tablet Take 1 Tab by mouth daily. Yes Provider, Historical   Biotin 2,500 mcg cap Take  by mouth. Yes Provider, Historical   cholecalciferol (VITAMIN D3) 2,000 unit cap capsule Take  by mouth two (2) times a day. Yes Provider, Historical   multivitamin, tx-iron-ca-min (THERA-M W/ IRON) 9 mg iron-400 mcg tab tablet Take 1 Tab by mouth daily. Yes Provider, Historical   diphenhydrAMINE (BENADRYL) 25 mg capsule Take 25 mg by mouth every six (6) hours as needed.    Yes Provider, Historical   ibuprofen (MOTRIN) 800 mg tablet  10/2/21   Provider, Historical   methylPREDNISolone (MEDROL DOSEPACK) 4 mg tablet Take 1 Tab by mouth Specific Days and Specific Times. Patient not taking: Reported on 10/5/2021 3/12/21   Gideon Alexander MD   cyclobenzaprine (FLEXERIL) 10 mg tablet TAKE 1 TABLET BY MOUTH THREE TIMES A DAY AS NEEDED FOR MUSCLE SPASMS 1/3/21   Gideon Alexander MD   loratadine (Claritin) 10 mg tablet Take 10 mg by mouth. Patient not taking: Reported on 10/5/2021    Provider, Historical   vitamin e (E GEMS) 100 unit capsule Take  by mouth daily. Provider, Historical   diclofenac EC (VOLTAREN) 75 mg EC tablet TAKE 1 TAB BY MOUTH TWO (2) TIMES A DAY. Patient not taking: Reported on 10/5/2021 9/24/18   Gideon Alexander MD   melatonin tab tablet Take  by mouth nightly. Patient not taking: Reported on 10/5/2021    Provider, Historical         Patient Active Problem List   Diagnosis Code    Depression F32. A    Migraines G43.909    HTN (hypertension) I10    Obesity E66.9         Review of Systems   Constitutional: Negative. Respiratory: Negative. Cardiovascular: Negative. Gastrointestinal: Negative. Visit Vitals  /70 (BP 1 Location: Right arm, BP Patient Position: Sitting, BP Cuff Size: Adult)   Pulse 100   Temp 98.9 °F (37.2 °C) (Temporal)   Resp 16   SpO2 97%       Physical Exam  Constitutional:       Appearance: Normal appearance. Cardiovascular:      Rate and Rhythm: Normal rate and regular rhythm. Pulmonary:      Effort: Pulmonary effort is normal.      Breath sounds: Normal breath sounds. Neurological:      Mental Status: She is alert. ASSESSMENT/PLAN  Diagnoses and all orders for this visit:    1. Gastroesophageal reflux disease, unspecified whether esophagitis present - advised avoidance of spicy foods, coffee and sodas. The patient will try OTC PPI's if symptoms present. Advised the patient to call back or return to office if symptoms worsen/change/persist.   Discussed expected course/resolution/complications of diagnosis in detail with patient.      Medication risks/benefits/costs/interactions/alternatives discussed with patient. The patient was given an after visit summary which includes diagnoses, current medications, & vitals. They expressed understanding with the diagnosis and plan.

## 2021-10-05 NOTE — PROGRESS NOTES
Chief Complaint   Patient presents with    Heartburn     burping, nausea, heart burn x 1 wk     Reviewed record in preparation for visit and have obtained necessary documentation. Identified pt with two pt identifiers(name and ). Health Maintenance Due   Topic    Hepatitis C Screening     DTaP/Tdap/Td series (1 - Tdap)    Colorectal Cancer Screening Combo     Shingrix Vaccine Age 50> (1 of 2)    Flu Vaccine (1)         Chief Complaint   Patient presents with    Heartburn     burping, nausea, heart burn x 1 wk        Wt Readings from Last 3 Encounters:   20 171 lb (77.6 kg)   20 162 lb (73.5 kg)   19 162 lb (73.5 kg)     Temp Readings from Last 3 Encounters:   10/05/21 98.9 °F (37.2 °C) (Temporal)   21 97.3 °F (36.3 °C) (Temporal)   21 97.3 °F (36.3 °C) (Temporal)     BP Readings from Last 3 Encounters:   10/05/21 120/70   21 120/70   21 100/70     Pulse Readings from Last 3 Encounters:   10/05/21 100   21 80   21 76           Learning Assessment:  :     Learning Assessment 2014   PRIMARY LEARNER Patient   HIGHEST LEVEL OF EDUCATION - PRIMARY LEARNER  4 YEARS OF COLLEGE   BARRIERS PRIMARY LEARNER NONE   CO-LEARNER CAREGIVER No   PRIMARY LANGUAGE ENGLISH   LEARNER PREFERENCE PRIMARY LISTENING   ANSWERED BY patient   RELATIONSHIP SELF       Depression Screening:  :     3 most recent PHQ Screens 3/12/2021   Little interest or pleasure in doing things Not at all   Feeling down, depressed, irritable, or hopeless Not at all   Total Score PHQ 2 0       Fall Risk Assessment:  :     Fall Risk Assessment, last 12 mths 3/12/2021   Able to walk? No       Abuse Screening:  :     No flowsheet data found.     Coordination of Care Questionnaire:  :     1) Have you been to an emergency room, urgent care clinic since your last visit? no   Hospitalized since your last visit? no             2) Have you seen or consulted any other health care providers outside of [address] since your last visit? no  (Include any pap smears or colon screenings in this section.)    3) Do you have an Advance Directive on file? no    4) Are you interested in receiving information on Advance Directives? NO      Patient is accompanied by self I have received verbal consent from Goldie An to discuss any/all medical information while they are present in the room. Reviewed record  In preparation for visit and have obtained necessary documentation.

## 2021-10-12 ENCOUNTER — TELEPHONE (OUTPATIENT)
Dept: INTERNAL MEDICINE CLINIC | Age: 51
End: 2021-10-12

## 2021-10-12 NOTE — TELEPHONE ENCOUNTER
Reason for call:  Pt has a diarrhea for 4 days what can she take for this    Is this a new problem: yes     Date of last appointment:  10/5/2021     Can we respond via [employer]: no    Best call back number:  [phone]

## 2021-10-13 NOTE — TELEPHONE ENCOUNTER
Returned patient call. She was referred to Nona CAGLE [facility] [address] [phone].

## 2021-11-08 DIAGNOSIS — F51.01 PRIMARY INSOMNIA: ICD-10-CM

## 2021-11-09 RX ORDER — ZOLPIDEM TARTRATE 5 MG/1
TABLET ORAL
Qty: 30 TABLET | Refills: 1 | Status: SHIPPED | OUTPATIENT
Start: 2021-11-09 | End: 2022-01-28

## 2021-12-17 DIAGNOSIS — G43.009 MIGRAINE WITHOUT AURA AND WITHOUT STATUS MIGRAINOSUS, NOT INTRACTABLE: ICD-10-CM

## 2021-12-20 RX ORDER — BUTALBITAL, ACETAMINOPHEN AND CAFFEINE 50; 325; 40 MG/1; MG/1; MG/1
TABLET ORAL
Qty: 30 TABLET | Refills: 1 | Status: SHIPPED | OUTPATIENT
Start: 2021-12-20 | End: 2022-03-30

## 2022-01-25 LAB — CREATININE, EXTERNAL: 0.73

## 2022-01-27 DIAGNOSIS — F51.01 PRIMARY INSOMNIA: ICD-10-CM

## 2022-01-28 RX ORDER — ZOLPIDEM TARTRATE 5 MG/1
TABLET ORAL
Qty: 30 TABLET | Refills: 1 | Status: SHIPPED | OUTPATIENT
Start: 2022-01-28 | End: 2022-05-10

## 2022-03-30 DIAGNOSIS — G43.009 MIGRAINE WITHOUT AURA AND WITHOUT STATUS MIGRAINOSUS, NOT INTRACTABLE: ICD-10-CM

## 2022-03-30 RX ORDER — BUTALBITAL, ACETAMINOPHEN AND CAFFEINE 50; 325; 40 MG/1; MG/1; MG/1
TABLET ORAL
Qty: 30 TABLET | Refills: 1 | Status: SHIPPED | OUTPATIENT
Start: 2022-03-30 | End: 2022-09-02

## 2022-05-04 DIAGNOSIS — F51.01 PRIMARY INSOMNIA: ICD-10-CM

## 2022-05-10 RX ORDER — ZOLPIDEM TARTRATE 5 MG/1
TABLET ORAL
Qty: 30 TABLET | Refills: 1 | Status: SHIPPED | OUTPATIENT
Start: 2022-05-10 | End: 2022-09-02

## 2022-07-08 ENCOUNTER — OFFICE VISIT (OUTPATIENT)
Dept: INTERNAL MEDICINE CLINIC | Age: 52
End: 2022-07-08
Payer: COMMERCIAL

## 2022-07-08 VITALS
DIASTOLIC BLOOD PRESSURE: 74 MMHG | SYSTOLIC BLOOD PRESSURE: 122 MMHG | RESPIRATION RATE: 16 BRPM | OXYGEN SATURATION: 97 % | HEART RATE: 77 BPM | HEIGHT: 62 IN | TEMPERATURE: 97.8 F | WEIGHT: 174 LBS | BODY MASS INDEX: 32.02 KG/M2

## 2022-07-08 DIAGNOSIS — Z78.0 MENOPAUSE: Primary | ICD-10-CM

## 2022-07-08 PROCEDURE — 99213 OFFICE O/P EST LOW 20 MIN: CPT | Performed by: INTERNAL MEDICINE

## 2022-07-08 NOTE — PATIENT INSTRUCTIONS
Please try Elyssa Love for your symptoms. Learning About Menopause  What is menopause? Menopause is the point in your life when you permanently stop having menstrual periods. After 1 year of having no periods, you've reached menopause. In most cases, menopause happens around age 48. But everyone's body has its own time line. You may stop having periods in your mid-40s. Or you might have them well into your 50s. Menopause is a natural part of growing older. You don't need treatment for it unless your symptoms bother you. But it's a good idea to learn all you can about menopause. Knowing what to expect can help you stay as healthy as possible. What happens during menopause? · It starts with perimenopause. This is the process of change that leads up to menopause. Perimenopause can start as early as your late 35s or as late as your early 46s. How long it lasts varies. But it usually lasts from 2 to 8 years. · During this time, your hormone levels will go up and down unevenly (fluctuate). This causes changes in your periods and other symptoms. In time, estrogen and progesterone levels drop enough that the menstrual cycle stops. Going a full year without having a period is usually considered menopause. · Low estrogen levels after menopause speed bone loss. This increases your risk of osteoporosis. Also, your risk of heart disease increases after menopause. · It's normal to have thinner, drier skin after menopause. The vaginal lining and the lower urinary tract also thin. This can make sex painful. It can also increase the risk of vaginal and urinary tract infections. What are the symptoms? Symptoms may include:  · Hot flashes. · Trouble sleeping. · Vaginal dryness. Symptoms related to mood and thinking may also happen around the time of menopause. These include:  · Mood swings, or feeling depressed or worried. · Problems with remembering or thinking clearly. You may have only a few mild symptoms.  Or you might have severe symptoms that disrupt your sleep and daily life. Menopause caused by surgery, chemotherapy, or radiation therapy can cause symptoms to be more severe. A condition you already had, such as depression, anxiety, sleep problems, or irritability, can also make symptoms worse. Symptoms tend to last or get worse the first year or more after menopause. Over time, hormones even out at low levels. Many symptoms improve or go away. But sometimes symptoms don't go away. After menopause, you may get other symptoms. These include drying and thinning of the skin, and vaginal and urinary tract changes. How are menopause symptoms treated? If your symptoms are bothering you, there are lifestyle changes and treatments that can help. Lifestyle changes    · Choose heart-healthy foods such as vegetables, fruits, nuts, beans, fish, and whole grains. Limit foods that have a lot of salt, fat, and sugar. Be sure you get enough calcium and vitamin D to help your bones stay strong.     · Get regular exercise. It can help you manage your weight, keep your heart and bones strong, and lift your mood.     · Limit caffeine, alcohol, and stress. These things may make symptoms worse. Limiting them may help you sleep better.     · If you smoke, stop. Quitting smoking can reduce hot flashes and long-term health risks. Medicines  If your symptoms bother you, talk with your doctor. You may want to try prescription medicines, such as:    · Hormonal birth control before menopause.     · Hormone therapy (HT).   · Antidepressants.     · Clonidine.     · Gabapentin. All medicines for menopause symptoms have possible risks or side effects. And there's a very small chance of serious health problems from taking hormone therapy. Be sure to talk to your doctor about your possible health risks before you start a treatment for menopause symptoms. Other treatments  You can try:    · Cognitive-behavioral therapy.  This may help reduce hot flashes.     · Hypnosis. This may help reduce the number and severity of hot flashes.     · Mind and body relaxation, such as breathing exercises. These may help with hot flashes and mood symptoms.     · Soy. Some people feel that taking soy may help improve symptoms. But studies have shown mixed results.     · Yoga or biofeedback. They may help reduce stress. Follow-up care is a key part of your treatment and safety. Be sure to make and go to all appointments, and call your doctor if you are having problems. It's also a good idea to know your test results and keep a list of the medicines you take. Where can you learn more? Go to http://www.gray.com/  Enter H199 in the search box to learn more about \"Learning About Menopause. \"  Current as of: November 22, 2021               Content Version: 13.2  © 8627-7592 [employer]. Care instructions adapted under license by [employer] (which disclaims liability or warranty for this information). If you have questions about a medical condition or this instruction, always ask your healthcare professional. [address] any warranty or liability for your use of this information.

## 2022-07-08 NOTE — PROGRESS NOTES
Follow Up Visit    Roge Buck is a 46 y.o. female. she presents for Menopause (discuss)    The patient reports having menopausal symptoms. She has been having hot flashes and difficulty sleeping. Discussed that she has not tried any specific therapy for this. She has an appointment scheduled with her gynecologist to discuss a plan. She is interested in looking for some solutions. She has had a colonoscopy recently. Noted diverticulitis    Discussed weight gain. She has felt tired and sluggish. Not exercising. Eating poorly at times. Patient Active Problem List   Diagnosis Code    Depression F32. A    Migraines G43.909    HTN (hypertension) I10    Obesity E66.9         Prior to Admission medications    Medication Sig Start Date End Date Taking? Authorizing Provider   zolpidem (AMBIEN) 5 mg tablet TAKE 1 TABLET BY MOUTH EVERY DAY AT BEDTIME AS NEEDED FOR SLEEP 5/10/22  Yes Stephanie Enriquez MD   butalbital-acetaminophen-caffeine (FIORICET, ESGIC) -40 mg per tablet TAKE 1 TABLET BY MOUTH EVERY 4 HOURS AS NEEDED FOR HEADACHE 3/30/22  Yes Stephanie Enriquez MD   ibuprofen (MOTRIN) 800 mg tablet  10/2/21  Yes Provider, Historical   augmented betamethasone dipropionate (DIPROLENE-AF) 0.05 % ointment APPLY TO AFFECTED AREA TWICE A DAY 6/7/21  Yes Stephanie Enriquez MD   fluticasone propionate (FLONASE) 50 mcg/actuation nasal spray INSTILL 2 SPRAYS IN BOTH NOSTRIL DAILY 2/9/21  Yes Stephanie Enriquez MD   cyclobenzaprine (FLEXERIL) 10 mg tablet TAKE 1 TABLET BY MOUTH THREE TIMES A DAY AS NEEDED FOR MUSCLE SPASMS 1/3/21  Yes Stephanie Enriquez MD   ondansetron (ZOFRAN ODT) 8 mg disintegrating tablet DISSOLVE 1 TABLET BY MOUTH EVERY 6 HOURS AS NEEDED FOR NAUSEA 1/3/21  Yes Stephanie Enriquez MD   b complex vitamins (B COMPLEX 1) tablet Take 1 Tab by mouth daily. Yes Provider, Historical   vitamin e (E GEMS) 100 unit capsule Take  by mouth daily.    Yes Provider, Historical   Biotin 2,500 mcg cap Take  by mouth. Yes Provider, Historical   cholecalciferol (VITAMIN D3) 2,000 unit cap capsule Take  by mouth two (2) times a day. Yes Provider, Historical   multivitamin, tx-iron-ca-min (THERA-M W/ IRON) 9 mg iron-400 mcg tab tablet Take 1 Tab by mouth daily. Yes Provider, Historical   diphenhydrAMINE (BENADRYL) 25 mg capsule Take 25 mg by mouth every six (6) hours as needed. Yes Provider, Historical         Health Maintenance   Topic Date Due    Hepatitis C Screening  Never done    DTaP/Tdap/Td series (1 - Tdap) Never done    Colorectal Cancer Screening Combo  Never done    Shingrix Vaccine Age 50> (1 of 2) Never done    COVID-19 Vaccine (2 - Pfizer 3-dose series) 10/16/2021    Depression Monitoring  03/12/2022    Flu Vaccine (1) 09/01/2022    Breast Cancer Screen Mammogram  12/20/2023    Cervical cancer screen  11/08/2024    Lipid Screen  11/27/2024    Pneumococcal 0-64 years  Aged Out       Review of Systems   Constitutional: Positive for malaise/fatigue. Respiratory: Negative. Cardiovascular: Negative. Endo/Heme/Allergies:        Hot flashes           Visit Vitals  /74 (BP 1 Location: Left arm, BP Patient Position: Sitting, BP Cuff Size: Adult)   Pulse 77   Temp 97.8 °F (36.6 °C) (Temporal)   Resp 16   Ht 5' 2\" (1.575 m)   Wt 174 lb (78.9 kg)   SpO2 97%   BMI 31.83 kg/m²       Physical Exam  Constitutional:       Appearance: She is well-developed. HENT:      Head: Normocephalic and atraumatic. Eyes:      Pupils: Pupils are equal, round, and reactive to light. Cardiovascular:      Rate and Rhythm: Normal rate and regular rhythm. Heart sounds: Normal heart sounds. No murmur heard. Pulmonary:      Effort: Pulmonary effort is normal. No respiratory distress. Breath sounds: Normal breath sounds. Musculoskeletal:      Cervical back: Neck supple. Lymphadenopathy:      Cervical: No cervical adenopathy.            ASSESSMENT/PLAN    Diagnoses and all orders for this visit:    1. Menopause - Discussed with the patient. She will try some natural remedies for menopausal symptoms. Follow up with Gyn. She will also begin exercise. Follow up in three months for wellness. Labs will be ordered. Follow-up and Dispositions    · Return in about 3 months (around 10/8/2022) for Full Physical - 30 minutes appointment.

## 2022-07-08 NOTE — PROGRESS NOTES
Chief Complaint   Patient presents with    Menopause     discuss     Reviewed record in preparation for visit and have obtained necessary documentation. Identified pt with two pt identifiers(name and ). Health Maintenance Due   Topic    Hepatitis C Screening     DTaP/Tdap/Td series (1 - Tdap)    Colorectal Cancer Screening Combo     Shingrix Vaccine Age 50> (1 of 2)    COVID-19 Vaccine (2 - Pfizer 3-dose series)    Depression Monitoring          Chief Complaint   Patient presents with    Menopause     discuss        Wt Readings from Last 3 Encounters:   22 174 lb (78.9 kg)   20 171 lb (77.6 kg)   20 162 lb (73.5 kg)     Temp Readings from Last 3 Encounters:   22 97.8 °F (36.6 °C) (Temporal)   10/05/21 98.9 °F (37.2 °C) (Temporal)   21 97.3 °F (36.3 °C) (Temporal)     BP Readings from Last 3 Encounters:   22 122/74   10/05/21 120/70   21 120/70     Pulse Readings from Last 3 Encounters:   22 77   10/05/21 100   21 80           Learning Assessment:  :     Learning Assessment 2014   PRIMARY LEARNER Patient   HIGHEST LEVEL OF EDUCATION - PRIMARY LEARNER  4 YEARS OF COLLEGE   BARRIERS PRIMARY LEARNER NONE   CO-LEARNER CAREGIVER No   PRIMARY LANGUAGE ENGLISH   LEARNER PREFERENCE PRIMARY LISTENING   ANSWERED BY patient   RELATIONSHIP SELF       Depression Screening:  :     3 most recent PHQ Screens 2022   Little interest or pleasure in doing things Several days   Feeling down, depressed, irritable, or hopeless Several days   Total Score PHQ 2 2       Fall Risk Assessment:  :     Fall Risk Assessment, last 12 mths 3/12/2021   Able to walk? No       Abuse Screening:  :     Abuse Screening Questionnaire 2022   Do you ever feel afraid of your partner? N   Are you in a relationship with someone who physically or mentally threatens you? N   Is it safe for you to go home?  Y       Coordination of Care Questionnaire:  :     1) Have you been to an emergency room, urgent care clinic since your last visit? no   Hospitalized since your last visit? no             2) Have you seen or consulted any other health care providers outside of [address] [place] [address] since your last visit? yes  (Include any pap smears or colon screenings in this section.)    3) Do you have an Advance Directive on file? no    4) Are you interested in receiving information on Advance Directives? NO      Patient is accompanied by self I have received verbal consent from Wendi Peña to discuss any/all medical information while they are present in the room. Reviewed record  In preparation for visit and have obtained necessary documentation.

## 2022-08-30 DIAGNOSIS — F51.01 PRIMARY INSOMNIA: ICD-10-CM

## 2022-08-30 DIAGNOSIS — G43.009 MIGRAINE WITHOUT AURA AND WITHOUT STATUS MIGRAINOSUS, NOT INTRACTABLE: ICD-10-CM

## 2022-09-02 RX ORDER — ZOLPIDEM TARTRATE 5 MG/1
TABLET ORAL
Qty: 30 TABLET | Refills: 1 | Status: SHIPPED | OUTPATIENT
Start: 2022-09-02

## 2022-09-02 RX ORDER — BUTALBITAL, ACETAMINOPHEN AND CAFFEINE 50; 325; 40 MG/1; MG/1; MG/1
TABLET ORAL
Qty: 30 TABLET | Refills: 1 | Status: SHIPPED | OUTPATIENT
Start: 2022-09-02

## 2022-09-12 ENCOUNTER — OFFICE VISIT (OUTPATIENT)
Dept: INTERNAL MEDICINE CLINIC | Age: 52
End: 2022-09-12
Payer: COMMERCIAL

## 2022-09-12 VITALS
OXYGEN SATURATION: 99 % | RESPIRATION RATE: 16 BRPM | BODY MASS INDEX: 31.83 KG/M2 | DIASTOLIC BLOOD PRESSURE: 80 MMHG | HEIGHT: 62 IN | TEMPERATURE: 97.8 F | SYSTOLIC BLOOD PRESSURE: 110 MMHG | HEART RATE: 67 BPM

## 2022-09-12 DIAGNOSIS — I83.813 VARICOSE VEINS OF BOTH LOWER EXTREMITIES WITH PAIN: Primary | ICD-10-CM

## 2022-09-12 PROCEDURE — 99213 OFFICE O/P EST LOW 20 MIN: CPT | Performed by: INTERNAL MEDICINE

## 2022-09-12 NOTE — PROGRESS NOTES
Acute Care Note    Starr Friend is 45 y.o. female. she presents for evaluation of Leg Pain      Noted pain in her legs. She has seen some puffiness in the ankle. No trauma to the leg. We discussed the presence of varicose veins. She has not worn compression hose. Discussed various methods of dealing with leg pain due to venous varicosity. Prior to Admission medications    Medication Sig Start Date End Date Taking? Authorizing Provider   zolpidem (AMBIEN) 5 mg tablet TAKE 1 TABLET BY MOUTH AT BEDTIME AS NEEDED FOR SLEEP 9/2/22  Yes Erick Gonzalez MD   butalbital-acetaminophen-caffeine (FIORICET, ESGIC) -40 mg per tablet TAKE 1 TABLET BY MOUTH EVERY 4 HOURS AS NEEDED FOR HEADACHE 9/2/22  Yes Erick Gonzalez MD   ibuprofen (MOTRIN) 800 mg tablet  10/2/21  Yes Provider, Historical   ondansetron (ZOFRAN ODT) 8 mg disintegrating tablet DISSOLVE 1 TABLET BY MOUTH EVERY 6 HOURS AS NEEDED FOR NAUSEA 1/3/21  Yes Erick Gonzalez MD   Biotin 2,500 mcg cap Take  by mouth. Yes Provider, Historical   diphenhydrAMINE (BENADRYL) 25 mg capsule Take 25 mg by mouth every six (6) hours as needed. Yes Provider, Historical   augmented betamethasone dipropionate (DIPROLENE-AF) 0.05 % ointment APPLY TO AFFECTED AREA TWICE A DAY 6/7/21   Erick Gonzalez MD   fluticasone propionate [facility]) 50 mcg/actuation nasal spray INSTILL 2 SPRAYS IN BOTH NOSTRIL DAILY 2/9/21   Erick Gonzalez MD   cyclobenzaprine (FLEXERIL) 10 mg tablet TAKE 1 TABLET BY MOUTH THREE TIMES A DAY AS NEEDED FOR MUSCLE SPASMS  Patient not taking: Reported on 9/12/2022 1/3/21   Erick Gonzalez MD   b complex vitamins tablet Take 1 Tab by mouth daily. Patient not taking: Reported on 9/12/2022    Provider, Historical   vitamin e (E GEMS) 100 unit capsule Take  by mouth daily. Patient not taking: Reported on 9/12/2022    Provider, Historical   cholecalciferol (VITAMIN D3) 2,000 unit cap capsule Take  by mouth two (2) times a day. Provider, Historical   multivitamin, tx-iron-ca-min (THERA-M W/ IRON) 9 mg iron-400 mcg tab tablet Take 1 Tab by mouth daily. Patient not taking: Reported on 9/12/2022    Provider, Historical         Patient Active Problem List   Diagnosis Code    Depression F32. A    Migraines G43.909    HTN (hypertension) I10    Obesity E66.9         Review of Systems   Musculoskeletal:         Bilateral leg pain       Visit Vitals  /80 (BP 1 Location: Left arm, BP Patient Position: Sitting, BP Cuff Size: Adult)   Pulse 67   Temp 97.8 °F (36.6 °C) (Temporal)   Resp 16   Ht 5' 2\" (1.575 m)   SpO2 99%   BMI 31.83 kg/m²       Physical Exam  Constitutional:       Appearance: Normal appearance. Cardiovascular:      Comments: Bilateral varicose veins at the ankles  Neurological:      Mental Status: She is alert. Diagnoses and all orders for this visit:    1. Varicose veins of both lower extremities with pain - Advised tylenol, compression hose and monitoring for now. If worsened, will evaluate further. Advised the patient to call back or return to office if symptoms worsen/change/persist.   Discussed expected course/resolution/complications of diagnosis in detail with patient. Medication risks/benefits/costs/interactions/alternatives discussed with patient. The patient was given an after visit summary which includes diagnoses, current medications, & vitals. They expressed understanding with the diagnosis and plan.

## 2022-11-28 ENCOUNTER — OFFICE VISIT (OUTPATIENT)
Dept: INTERNAL MEDICINE CLINIC | Age: 52
End: 2022-11-28
Payer: COMMERCIAL

## 2022-11-28 VITALS
OXYGEN SATURATION: 98 % | HEART RATE: 74 BPM | DIASTOLIC BLOOD PRESSURE: 84 MMHG | TEMPERATURE: 97.8 F | RESPIRATION RATE: 16 BRPM | HEIGHT: 62 IN | SYSTOLIC BLOOD PRESSURE: 130 MMHG | BODY MASS INDEX: 31.83 KG/M2

## 2022-11-28 DIAGNOSIS — G43.009 MIGRAINE WITHOUT AURA AND WITHOUT STATUS MIGRAINOSUS, NOT INTRACTABLE: Primary | ICD-10-CM

## 2022-11-28 DIAGNOSIS — Z13.220 LIPID SCREENING: ICD-10-CM

## 2022-11-28 PROCEDURE — 99214 OFFICE O/P EST MOD 30 MIN: CPT | Performed by: INTERNAL MEDICINE

## 2022-11-28 PROCEDURE — 3074F SYST BP LT 130 MM HG: CPT | Performed by: INTERNAL MEDICINE

## 2022-11-28 PROCEDURE — 3078F DIAST BP <80 MM HG: CPT | Performed by: INTERNAL MEDICINE

## 2022-11-28 RX ORDER — ATOGEPANT 10 MG/1
TABLET ORAL
Status: CANCELLED | OUTPATIENT
Start: 2022-11-28

## 2022-11-28 NOTE — PROGRESS NOTES
Acute Care Note    Reshma Weinstein is 45 y.o. female. she presents for evaluation of Headache    Neurological Review  This is a chronic problem. She is here today to talk about headaches. Course to date has been more frequent. Duration of individual headaches: 20-30 minute(s), frequency daily. Associated symptoms: dizziness. Pain relief: prescription medications - butalbital.  She reports taking medications as instructed, no medication side effects noted. We discussed that these are not as effective recently. She is interested in alternative medications. Of note, she has had an appointment with her [facility]. She was given some Efexor due to stress at home. She is presently going through a separation. We discussed that the stress of this situation could contribute to her headaches. She is open to discussing this aspect of her care with a therapist.             Prior to Admission medications    Medication Sig Start Date End Date Taking? Authorizing Provider   zolpidem (AMBIEN) 5 mg tablet TAKE 1 TABLET BY MOUTH AT BEDTIME AS NEEDED FOR SLEEP 9/2/22  Yes Chrissie Lang MD   butalbital-acetaminophen-caffeine (FIORICET, ESGIC) -40 mg per tablet TAKE 1 TABLET BY MOUTH EVERY 4 HOURS AS NEEDED FOR HEADACHE 9/2/22  Yes Chrissie Lang MD   ibuprofen (MOTRIN) 800 mg tablet  10/2/21  Yes Provider, Historical   ondansetron (ZOFRAN ODT) 8 mg disintegrating tablet DISSOLVE 1 TABLET BY MOUTH EVERY 6 HOURS AS NEEDED FOR NAUSEA 1/3/21  Yes Chrissie Lang MD   Biotin 2,500 mcg cap Take  by mouth. Yes Provider, Historical   diphenhydrAMINE (BENADRYL) 25 mg capsule Take 25 mg by mouth every six (6) hours as needed.    Yes Provider, Historical   augmented betamethasone dipropionate (DIPROLENE-AF) 0.05 % ointment APPLY TO AFFECTED AREA TWICE A DAY 6/7/21   Chrissie Lang MD   fluticasone propionate (FLONASE) 50 mcg/actuation nasal spray INSTILL 2 SPRAYS IN BOTH NOSTRIL DAILY  Patient not taking: Reported on 11/28/2022 2/9/21   Kerry Lane MD   cyclobenzaprine (FLEXERIL) 10 mg tablet TAKE 1 TABLET BY MOUTH THREE TIMES A DAY AS NEEDED FOR MUSCLE SPASMS  Patient not taking: No sig reported 1/3/21   Kerry Lane MD   b complex vitamins tablet Take 1 Tab by mouth daily. Patient not taking: No sig reported    Provider, Historical   vitamin e (E GEMS) 100 unit capsule Take  by mouth daily. Patient not taking: No sig reported    Provider, Historical   cholecalciferol (VITAMIN D3) 2,000 unit cap capsule Take  by mouth two (2) times a day. Patient not taking: Reported on 11/28/2022    Provider, Historical   multivitamin, tx-iron-ca-min (THERA-M W/ IRON) 9 mg iron-400 mcg tab tablet Take 1 Tab by mouth daily. Patient not taking: No sig reported    Provider, Historical         Patient Active Problem List   Diagnosis Code    Depression F32. A    Migraines G43.909    HTN (hypertension) I10    Obesity E66.9         Review of Systems   Constitutional: Negative. Respiratory: Negative. Cardiovascular: Negative. Gastrointestinal: Negative. Neurological:  Positive for headaches. Visit Vitals  /84   Pulse 74   Temp 97.8 °F (36.6 °C) (Temporal)   Resp 16   Ht 5' 2\" (1.575 m)   SpO2 98%   BMI 31.83 kg/m²       Physical Exam  Constitutional:       Appearance: She is well-developed. HENT:      Head: Normocephalic and atraumatic. Eyes:      Pupils: Pupils are equal, round, and reactive to light. Cardiovascular:      Rate and Rhythm: Normal rate and regular rhythm. Heart sounds: Normal heart sounds. No murmur heard. Pulmonary:      Effort: Pulmonary effort is normal. No respiratory distress. Breath sounds: Normal breath sounds. Musculoskeletal:      Cervical back: Neck supple. Lymphadenopathy:      Cervical: No cervical adenopathy. ASSESSMENT/PLAN  Diagnoses and all orders for this visit:    1.  Migraine without aura and without status migrainosus, not intractable  -     REFERRAL TO NEUROLOGY  -     ubrogepant (Ubrelvy) 50 mg tablet; Take 1 Tablet by mouth once as needed for Migraine for up to 1 dose. 2. Lipid screening  -     CBC WITH AUTOMATED DIFF; Future  -     METABOLIC PANEL, COMPREHENSIVE; Future  -     LIPID PANEL; Future       Advised the patient to call back or return to office if symptoms worsen/change/persist.   Discussed expected course/resolution/complications of diagnosis in detail with patient. Medication risks/benefits/costs/interactions/alternatives discussed with patient. The patient was given an after visit summary which includes diagnoses, current medications, & vitals. They expressed understanding with the diagnosis and plan.

## 2022-11-28 NOTE — PROGRESS NOTES
Chief Complaint   Patient presents with    Headache     Reviewed record in preparation for visit and have obtained necessary documentation. Identified pt with two pt identifiers(name and ). Health Maintenance Due   Topic    Hepatitis C Screening     DTaP/Tdap/Td series (1 - Tdap)    Colorectal Cancer Screening Combo     Shingrix Vaccine Age 50> (1 of 2)    COVID-19 Vaccine (2 - Pfizer series)    Flu Vaccine (1)         Chief Complaint   Patient presents with    Headache        Wt Readings from Last 3 Encounters:   22 174 lb (78.9 kg)   20 171 lb (77.6 kg)   20 162 lb (73.5 kg)     Temp Readings from Last 3 Encounters:   22 97.8 °F (36.6 °C) (Temporal)   22 97.8 °F (36.6 °C) (Temporal)   22 97.8 °F (36.6 °C) (Temporal)     BP Readings from Last 3 Encounters:   22 130/84   22 110/80   22 122/74     Pulse Readings from Last 3 Encounters:   22 74   22 67   22 77           Learning Assessment:  :     Learning Assessment 2014   PRIMARY LEARNER Patient   HIGHEST LEVEL OF EDUCATION - PRIMARY LEARNER  4 YEARS OF COLLEGE   BARRIERS PRIMARY LEARNER NONE   CO-LEARNER CAREGIVER No   PRIMARY LANGUAGE ENGLISH   LEARNER PREFERENCE PRIMARY LISTENING   ANSWERED BY patient   RELATIONSHIP SELF       Depression Screening:  :     3 most recent PHQ Screens 2022   Little interest or pleasure in doing things Not at all   Feeling down, depressed, irritable, or hopeless Not at all   Total Score PHQ 2 0       Fall Risk Assessment:  :     Fall Risk Assessment, last 12 mths 3/12/2021   Able to walk? No       Abuse Screening:  :     Abuse Screening Questionnaire 2022   Do you ever feel afraid of your partner? N N   Are you in a relationship with someone who physically or mentally threatens you? N N   Is it safe for you to go home?  Y Y       Coordination of Care Questionnaire:  :     1) Have you been to an emergency room, urgent care clinic since your last visit? no   Hospitalized since your last visit? no             2) Have you seen or consulted any other health care providers outside of [address] [place] [address] since your last visit? no  (Include any pap smears or colon screenings in this section.)    3) Do you have an Advance Directive on file? no    4) Are you interested in receiving information on Advance Directives? NO      Patient is accompanied by self I have received verbal consent from Farooq Galindo to discuss any/all medical information while they are present in the room. Reviewed record  In preparation for visit and have obtained necessary documentation.

## 2022-12-08 ENCOUNTER — TELEPHONE (OUTPATIENT)
Dept: INTERNAL MEDICINE CLINIC | Age: 52
End: 2022-12-08

## 2022-12-08 LAB
CREATININE, EXTERNAL: 0.83
LDL-C, EXTERNAL: 130

## 2023-01-10 DIAGNOSIS — R11.0 NAUSEA: ICD-10-CM

## 2023-01-16 RX ORDER — ONDANSETRON 8 MG/1
TABLET, ORALLY DISINTEGRATING ORAL
Qty: 30 TABLET | Refills: 0 | Status: SHIPPED | OUTPATIENT
Start: 2023-01-16

## 2023-02-20 ENCOUNTER — OFFICE VISIT (OUTPATIENT)
Dept: INTERNAL MEDICINE CLINIC | Age: 53
End: 2023-02-20
Payer: COMMERCIAL

## 2023-02-20 ENCOUNTER — TELEPHONE (OUTPATIENT)
Dept: INTERNAL MEDICINE CLINIC | Age: 53
End: 2023-02-20

## 2023-02-20 VITALS
HEIGHT: 62 IN | TEMPERATURE: 97.1 F | WEIGHT: 167.4 LBS | HEART RATE: 78 BPM | BODY MASS INDEX: 30.8 KG/M2 | SYSTOLIC BLOOD PRESSURE: 120 MMHG | DIASTOLIC BLOOD PRESSURE: 70 MMHG | OXYGEN SATURATION: 98 % | RESPIRATION RATE: 14 BRPM

## 2023-02-20 DIAGNOSIS — L30.9 ECZEMA, UNSPECIFIED TYPE: ICD-10-CM

## 2023-02-20 DIAGNOSIS — M19.049 HAND ARTHRITIS: Primary | ICD-10-CM

## 2023-02-20 DIAGNOSIS — F51.01 PRIMARY INSOMNIA: ICD-10-CM

## 2023-02-20 PROCEDURE — 99214 OFFICE O/P EST MOD 30 MIN: CPT | Performed by: INTERNAL MEDICINE

## 2023-02-20 RX ORDER — LORATADINE 10 MG/1
TABLET ORAL
COMMUNITY

## 2023-02-20 RX ORDER — BETAMETHASONE DIPROPIONATE 0.5 MG/G
OINTMENT TOPICAL
Qty: 15 G | Refills: 0 | Status: SHIPPED | OUTPATIENT
Start: 2023-02-20

## 2023-02-20 RX ORDER — ZOLPIDEM TARTRATE 5 MG/1
TABLET ORAL
Qty: 30 TABLET | Refills: 1 | Status: SHIPPED | OUTPATIENT
Start: 2023-02-20

## 2023-02-20 NOTE — PROGRESS NOTES
Chief Complaint   Patient presents with    Hand Pain     Reviewed record in preparation for visit and have obtained necessary documentation. Identified pt with two pt identifiers(name and ). Health Maintenance Due   Topic    Hepatitis C Screening     DTaP/Tdap/Td series (1 - Tdap)    Colorectal Cancer Screening Combo     Shingles Vaccine (1 of 2)    COVID-19 Vaccine (2 - Pfizer series)    Flu Vaccine (1)         Chief Complaint   Patient presents with    Head Pain        Wt Readings from Last 3 Encounters:   23 167 lb 6.4 oz (75.9 kg)   22 174 lb (78.9 kg)   20 171 lb (77.6 kg)     Temp Readings from Last 3 Encounters:   23 97.1 °F (36.2 °C) (Temporal)   22 97.8 °F (36.6 °C) (Temporal)   22 97.8 °F (36.6 °C) (Temporal)     BP Readings from Last 3 Encounters:   23 120/70   22 130/84   22 110/80     Pulse Readings from Last 3 Encounters:   23 78   22 74   22 67           Learning Assessment:  :     Learning Assessment 2014   PRIMARY LEARNER Patient   HIGHEST LEVEL OF EDUCATION - PRIMARY LEARNER  4 YEARS OF COLLEGE   BARRIERS PRIMARY LEARNER NONE   CO-LEARNER CAREGIVER No   PRIMARY LANGUAGE ENGLISH   LEARNER PREFERENCE PRIMARY LISTENING   ANSWERED BY patient   RELATIONSHIP SELF       Depression Screening:  :     3 most recent PHQ Screens 2022   Little interest or pleasure in doing things Not at all   Feeling down, depressed, irritable, or hopeless Not at all   Total Score PHQ 2 0       Fall Risk Assessment:  :     Fall Risk Assessment, last 12 mths 3/12/2021   Able to walk? No       Abuse Screening:  :     Abuse Screening Questionnaire 2022   Do you ever feel afraid of your partner? N N   Are you in a relationship with someone who physically or mentally threatens you? N N   Is it safe for you to go home?  Y Y       Coordination of Care Questionnaire:  :     1) Have you been to an emergency room, urgent care clinic since your last visit? no   Hospitalized since your last visit? no             2) Have you seen or consulted any other health care providers outside of [address] [place] [address] since your last visit? no  (Include any pap smears or colon screenings in this section.)    3) Do you have an Advance Directive on file? no    4) Are you interested in receiving information on Advance Directives? NO      Patient is accompanied by self I have received verbal consent from Alissa Murillo to discuss any/all medical information while they are present in the room. Reviewed record  In preparation for visit and have obtained necessary documentation.

## 2023-02-21 NOTE — PROGRESS NOTES
Acute Care Note    Michelle Tejeda is 45 y.o. female. she presents for evaluation of Hand Pain      The patient presents with complaints of pain in her hands which she has noted has been present for the past few weeks. 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  This has been previously attributed to her work activities. However she does have a family history of rheumatoid arthritis in her mother and sister. Prior to Admission medications    Medication Sig Start Date End Date Taking? Authorizing Provider   zolpidem (AMBIEN) 5 mg tablet TAKE 1 TABLET BY MOUTH EVERY DAY AT BEDTIME AS NEEDED FOR SLEEP 2/20/23  Yes Mary Zamora MD   augmented betamethasone dipropionate (DIPROLENE-AF) 0.05 % ointment APPLY TO AFFECTED AREA TWICE A DAY 2/20/23  Yes Mary Zamora MD   Biotin 2,500 mcg cap Take  by mouth. Yes Provider, Historical   cholecalciferol (VITAMIN D3) 2,000 unit cap capsule Take  by mouth two (2) times a day.    Yes Provider, Historical   loratadine (Claritin) 10 mg tablet Claritin    Provider, Historical   ondansetron (ZOFRAN ODT) 8 mg disintegrating tablet DISSOLVE 1 TABLET BY MOUTH EVERY 6 HOURS AS NEEDED FOR NAUSEA 1/16/23   Mary Zamora MD   zolpidem (AMBIEN) 5 mg tablet TAKE 1 TABLET BY MOUTH AT BEDTIME AS NEEDED FOR SLEEP 9/2/22 2/20/23  Mary Zamora MD   ibuprofen (MOTRIN) 800 mg tablet  10/2/21   Provider, Historical   augmented betamethasone dipropionate (DIPROLENE-AF) 0.05 % ointment APPLY TO AFFECTED AREA TWICE A DAY 6/7/21 2/20/23  Mary Zamora MD   fluticasone propionate (FLONASE) 50 mcg/actuation nasal spray INSTILL 2 SPRAYS IN BOTH NOSTRIL DAILY  Patient not taking: No sig reported 2/9/21   Mary Zamora MD   cyclobenzaprine (FLEXERIL) 10 mg tablet TAKE 1 TABLET BY MOUTH THREE TIMES A DAY AS NEEDED FOR MUSCLE SPASMS  Patient not taking: No sig reported 1/3/21   Mary Zamora MD   b complex vitamins tablet Take 1 Tab by mouth daily. Patient not taking: No sig reported    Provider, Historical   vitamin e (E GEMS) 100 unit capsule Take  by mouth daily. Patient not taking: No sig reported    Provider, Historical   multivitamin, tx-iron-ca-min (THERA-M W/ IRON) 9 mg iron-400 mcg tab tablet Take 1 Tab by mouth daily. Patient not taking: No sig reported    Provider, Historical   diphenhydrAMINE (BENADRYL) 25 mg capsule Take 25 mg by mouth every six (6) hours as needed. Provider, Historical         Patient Active Problem List   Diagnosis Code    Depression F32. A    Migraines G43.909    HTN (hypertension) I10    Obesity E66.9         ROS      Visit Vitals  /70   Pulse 78   Temp 97.1 °F (36.2 °C) (Temporal)   Resp 14   Ht 5' 2\" (1.575 m)   Wt 167 lb 6.4 oz (75.9 kg)   SpO2 98%   BMI 30.62 kg/m²       Physical Exam  Tenderness and mild swelling present at some of her distal interphalangeal joints. ASSESSMENT/PLAN  Diagnoses and all orders for this visit:    1. Hand arthritis  -     C REACTIVE PROTEIN, QT; Future  -     SED RATE (ESR); Future  -     JOHNATHAN, DIRECT, W/REFLEX; Future  -     RHEUMASSURE; Future  -     C REACTIVE PROTEIN, QT; Future  -     SED RATE (ESR); Future  -     JOHNATHAN, DIRECT, W/REFLEX; Future  -     RHEUMASSURE; Future    2. Eczema, unspecified type  -     augmented betamethasone dipropionate (DIPROLENE-AF) 0.05 % ointment; APPLY TO AFFECTED AREA TWICE A DAY         Advised the patient to call back or return to office if symptoms worsen/change/persist.   Discussed expected course/resolution/complications of diagnosis in detail with patient. Medication risks/benefits/costs/interactions/alternatives discussed with patient. The patient was given an after visit summary which includes diagnoses, current medications, & vitals. They expressed understanding with the diagnosis and plan.

## 2023-02-28 ENCOUNTER — TELEPHONE (OUTPATIENT)
Dept: INTERNAL MEDICINE CLINIC | Age: 53
End: 2023-02-28

## 2023-02-28 NOTE — TELEPHONE ENCOUNTER
Reason for call:  Spoke with pt. She has question about her lab. Pt requested a call back.     Is this a new problem: yes     Date of last appointment:  2/20/2023     Can we respond via [employer]: no    Best call back number: [phone], [facility]

## 2023-03-01 NOTE — TELEPHONE ENCOUNTER
Pt called again this morning about her labs. I told her it looked like we have not gotten the results -- I asked where she had them done. Since she works at [employer], she had them done there. She will see what the hold up is and hopefully they can forward them to you asap.

## 2023-03-02 ENCOUNTER — TELEPHONE (OUTPATIENT)
Dept: INTERNAL MEDICINE CLINIC | Age: 53
End: 2023-03-02

## 2023-03-02 NOTE — TELEPHONE ENCOUNTER
Reason for call:    Patient would like to know if Dr. Kate Pretty received her lab results.     Is this a new problem: yes     Date of last appointment:  2/20/2023     Can we respond via [employer]: no    Best call back number:     [facility] - [phone]

## 2023-03-08 ENCOUNTER — TELEPHONE (OUTPATIENT)
Dept: INTERNAL MEDICINE CLINIC | Age: 53
End: 2023-03-08

## 2023-03-08 NOTE — TELEPHONE ENCOUNTER
Unsure why this is high priority. The results have not been scanned in chart.  Will retreive from lab mery online portal and give to pcp to review

## 2023-03-08 NOTE — TELEPHONE ENCOUNTER
Reason for call:  Spoke with pt. Patient would like to know if Dr. Ramandeep Gooden received her lab results.     Is this a new problem: yes     Date of last appointment:  2/20/2023     Can we respond via [employer]: no    Best call back number: Lizbeth Major     [phone]

## 2023-04-12 ENCOUNTER — OFFICE VISIT (OUTPATIENT)
Dept: INTERNAL MEDICINE CLINIC | Age: 53
End: 2023-04-12
Payer: COMMERCIAL

## 2023-04-12 VITALS
WEIGHT: 166.8 LBS | OXYGEN SATURATION: 98 % | HEART RATE: 84 BPM | HEIGHT: 62 IN | SYSTOLIC BLOOD PRESSURE: 100 MMHG | TEMPERATURE: 98.4 F | RESPIRATION RATE: 16 BRPM | BODY MASS INDEX: 30.69 KG/M2 | DIASTOLIC BLOOD PRESSURE: 70 MMHG

## 2023-04-12 DIAGNOSIS — R10.11 RUQ PAIN: Primary | ICD-10-CM

## 2023-04-12 PROCEDURE — 99214 OFFICE O/P EST MOD 30 MIN: CPT | Performed by: INTERNAL MEDICINE

## 2023-04-12 NOTE — PROGRESS NOTES
Acute Care Note    Emanuel Rossi is 45 y.o. female. she presents for evaluation of Abdominal Pain (X 1 month off and on)    She has had RUQ pain off and on for the past month    Fatty liver(?)    Prior to Admission medications    Medication Sig Start Date End Date Taking? Authorizing Provider   Ubrelvy 50 mg tablet TAKE 1 TABLET BY MOUTH ONCE AS NEEDED FOR MIGRAINE FOR UP TO 1 DOSE. 4/10/23  Yes Jose M Darnell MD   loratadine (CLARITIN) 10 mg tablet Claritin   Yes Provider, Historical   augmented betamethasone dipropionate (DIPROLENE-AF) 0.05 % ointment APPLY TO AFFECTED AREA TWICE A DAY 2/20/23  Yes Jose M Darnell MD   Biotin 2,500 mcg cap Take  by mouth. Yes Provider, Historical   cholecalciferol (VITAMIN D3) 2,000 unit cap capsule Take  by mouth two (2) times a day. Yes Provider, Historical   multivitamin, tx-iron-ca-min (THERA-M W/ IRON) 9 mg iron-400 mcg tab tablet Take 1 Tablet by mouth daily. Yes Provider, Historical   diphenhydrAMINE (BENADRYL) 25 mg capsule Take 1 Capsule by mouth every six (6) hours as needed. Yes Provider, Historical   zolpidem (AMBIEN) 5 mg tablet TAKE 1 TABLET BY MOUTH EVERY DAY AT BEDTIME AS NEEDED FOR SLEEP 2/20/23   Jose M Darnell MD   ondansetron (ZOFRAN ODT) 8 mg disintegrating tablet DISSOLVE 1 TABLET BY MOUTH EVERY 6 HOURS AS NEEDED FOR NAUSEA 1/16/23   Jose M Darnell MD   ibuprofen (MOTRIN) 800 mg tablet  10/2/21   Provider, Historical   fluticasone propionate (FLONASE) 50 mcg/actuation nasal spray INSTILL 2 SPRAYS IN BOTH NOSTRIL DAILY  Patient not taking: Reported on 11/28/2022 2/9/21   Jose M Darnell MD   cyclobenzaprine (FLEXERIL) 10 mg tablet TAKE 1 TABLET BY MOUTH THREE TIMES A DAY AS NEEDED FOR MUSCLE SPASMS  Patient not taking: Reported on 9/12/2022 1/3/21   Jose M Darnell MD   b complex vitamins tablet Take 1 Tab by mouth daily.   Patient not taking: No sig reported    Provider, Historical   vitamin e (E GEMS) 100 unit capsule Take  by mouth daily. Patient not taking: Reported on 4/12/2023    Provider, Historical         Patient Active Problem List   Diagnosis Code    Depression F32. A    Migraines G43.909    HTN (hypertension) I10    Obesity E66.9         ROS      Visit Vitals  /70   Pulse 84   Temp 98.4 °F (36.9 °C) (Temporal)   Resp 16   Ht 5' 2\" (1.575 m)   Wt 166 lb 12.8 oz (75.7 kg)   SpO2 98%   BMI 30.51 kg/m²       Physical Exam        ASSESSMENT/PLAN  Diagnoses and all orders for this visit:    1. RUQ pain  -     US ABD LTD; Future         Advised the patient to call back or return to office if symptoms worsen/change/persist.   Discussed expected course/resolution/complications of diagnosis in detail with patient. Medication risks/benefits/costs/interactions/alternatives discussed with patient. The patient was given an after visit summary which includes diagnoses, current medications, & vitals. They expressed understanding with the diagnosis and plan.

## 2023-04-12 NOTE — PROGRESS NOTES
Chief Complaint   Patient presents with    Abdominal Pain     X 1 month off and on     Reviewed record in preparation for visit and have obtained necessary documentation. Identified pt with two pt identifiers(name and ). Health Maintenance Due   Topic    Hepatitis C Screening     DTaP/Tdap/Td series (1 - Tdap)    Colorectal Cancer Screening Combo     Shingles Vaccine (1 of 2)    COVID-19 Vaccine (2 - Pfizer series)         Chief Complaint   Patient presents with    Abdominal Pain     X 1 month off and on        Wt Readings from Last 3 Encounters:   23 166 lb 12.8 oz (75.7 kg)   23 167 lb 6.4 oz (75.9 kg)   22 174 lb (78.9 kg)     Temp Readings from Last 3 Encounters:   23 98.4 °F (36.9 °C) (Temporal)   23 97.1 °F (36.2 °C) (Temporal)   22 97.8 °F (36.6 °C) (Temporal)     BP Readings from Last 3 Encounters:   23 100/70   23 120/70   22 130/84     Pulse Readings from Last 3 Encounters:   23 84   23 78   22 74           Learning Assessment:  :     Learning Assessment 2014   PRIMARY LEARNER Patient   HIGHEST LEVEL OF EDUCATION - PRIMARY LEARNER  4 YEARS OF COLLEGE   BARRIERS PRIMARY LEARNER NONE   CO-LEARNER CAREGIVER No   PRIMARY LANGUAGE ENGLISH   LEARNER PREFERENCE PRIMARY LISTENING   ANSWERED BY patient   RELATIONSHIP SELF       Depression Screening:  :     3 most recent PHQ Screens 2023   Little interest or pleasure in doing things Not at all   Feeling down, depressed, irritable, or hopeless Not at all   Total Score PHQ 2 0       Fall Risk Assessment:  :     Fall Risk Assessment, last 12 mths 3/12/2021   Able to walk? No       Abuse Screening:  :     Abuse Screening Questionnaire 2023   Do you ever feel afraid of your partner? N N N   Are you in a relationship with someone who physically or mentally threatens you? N N N   Is it safe for you to go home?  Doris Richard       Coordination of Care Questionnaire:  :     1) Have you been to an emergency room, urgent care clinic since your last visit? no   Hospitalized since your last visit? no             2) Have you seen or consulted any other health care providers outside of [address] [place] [address] since your last visit? no  (Include any pap smears or colon screenings in this section.)    3) Do you have an Advance Directive on file? no    4) Are you interested in receiving information on Advance Directives? NO      Patient is accompanied by self I have received verbal consent from Jeff Vail to discuss any/all medical information while they are present in the room. Reviewed record  In preparation for visit and have obtained necessary documentation.

## 2023-05-04 NOTE — PROGRESS NOTES
Chief Complaint   Patient presents with    Leg Pain     Reviewed record in preparation for visit and have obtained necessary documentation. Identified pt with two pt identifiers(name and ). Health Maintenance Due   Topic    Hepatitis C Screening     DTaP/Tdap/Td series (1 - Tdap)    Colorectal Cancer Screening Combo     Shingrix Vaccine Age 50> (1 of 2)    COVID-19 Vaccine (2 - Pfizer series)    Flu Vaccine (1)         Chief Complaint   Patient presents with    Leg Pain        Wt Readings from Last 3 Encounters:   22 174 lb (78.9 kg)   20 171 lb (77.6 kg)   20 162 lb (73.5 kg)     Temp Readings from Last 3 Encounters:   22 97.8 °F (36.6 °C) (Temporal)   22 97.8 °F (36.6 °C) (Temporal)   10/05/21 98.9 °F (37.2 °C) (Temporal)     BP Readings from Last 3 Encounters:   22 110/80   22 122/74   10/05/21 120/70     Pulse Readings from Last 3 Encounters:   22 67   22 77   10/05/21 100           Learning Assessment:  :     Learning Assessment 2014   PRIMARY LEARNER Patient   HIGHEST LEVEL OF EDUCATION - PRIMARY LEARNER  4 YEARS OF COLLEGE   BARRIERS PRIMARY LEARNER NONE   CO-LEARNER CAREGIVER No   PRIMARY LANGUAGE ENGLISH   LEARNER PREFERENCE PRIMARY LISTENING   ANSWERED BY patient   RELATIONSHIP SELF       Depression Screening:  :     3 most recent PHQ Screens 2022   Little interest or pleasure in doing things Not at all   Feeling down, depressed, irritable, or hopeless Not at all   Total Score PHQ 2 0       Fall Risk Assessment:  :     Fall Risk Assessment, last 12 mths 3/12/2021   Able to walk? No       Abuse Screening:  :     Abuse Screening Questionnaire 2022   Do you ever feel afraid of your partner? N N   Are you in a relationship with someone who physically or mentally threatens you? N N   Is it safe for you to go home?  Y Y       Coordination of Care Questionnaire:  :     1) Have you been to an emergency room, urgent care clinic Well , 24 Months Old  Well-child exams are recommended visits with a health care provider to track your child's growth and development at certain ages. This sheet tells you what to expect during this visit.  Recommended immunizations  Your child may get doses of the following vaccines if needed to catch up on missed doses:  Hepatitis B vaccine.  Diphtheria and tetanus toxoids and acellular pertussis (DTaP) vaccine.  Inactivated poliovirus vaccine.  Haemophilus influenzae type b (Hib) vaccine. Your child may get doses of this vaccine if needed to catch up on missed doses, or if he or she has certain high-risk conditions.  Pneumococcal conjugate (PCV13) vaccine. Your child may get this vaccine if he or she:  Has certain high-risk conditions.  Missed a previous dose.  Received the 7-valent pneumococcal vaccine (PCV7).  Pneumococcal polysaccharide (PPSV23) vaccine. Your child may get doses of this vaccine if he or she has certain high-risk conditions.  Influenza vaccine (flu shot). Starting at age 6 months, your child should be given the flu shot every year. Children between the ages of 6 months and 8 years who get the flu shot for the first time should get a second dose at least 4 weeks after the first dose. After that, only a single yearly (annual) dose is recommended.  Measles, mumps, and rubella (MMR) vaccine. Your child may get doses of this vaccine if needed to catch up on missed doses. A second dose of a 2-dose series should be given at age 4-6 years. The second dose may be given before 4 years of age if it is given at least 4 weeks after the first dose.  Varicella vaccine. Your child may get doses of this vaccine if needed to catch up on missed doses. A second dose of a 2-dose series should be given at age 4-6 years. If the second dose is given before 4 years of age, it should be given at least 3 months after the first dose.  Hepatitis A vaccine. Children who received one dose before 24 months of age  should get a second dose 6-18 months after the first dose. If the first dose has not been given by 24 months of age, your child should get this vaccine only if he or she is at risk for infection or if you want your child to have hepatitis A protection.  Meningococcal conjugate vaccine. Children who have certain high-risk conditions, are present during an outbreak, or are traveling to a country with a high rate of meningitis should get this vaccine.  Your child may receive vaccines as individual doses or as more than one vaccine together in one shot (combination vaccines). Talk with your child's health care provider about the risks and benefits of combination vaccines.  Testing  Vision  Your child's eyes will be assessed for normal structure (anatomy) and function (physiology). Your child may have more vision tests done depending on his or her risk factors.  Other tests    Depending on your child's risk factors, your child's health care provider may screen for:  Low red blood cell count (anemia).  Lead poisoning.  Hearing problems.  Tuberculosis (TB).  High cholesterol.  Autism spectrum disorder (ASD).  Starting at this age, your child's health care provider will measure BMI (body mass index) annually to screen for obesity. BMI is an estimate of body fat and is calculated from your child's height and weight.  General instructions  Parenting tips  Praise your child's good behavior by giving him or her your attention.  Spend some one-on-one time with your child daily. Vary activities. Your child's attention span should be getting longer.  Set consistent limits. Keep rules for your child clear, short, and simple.  Discipline your child consistently and fairly.  Make sure your child's caregivers are consistent with your discipline routines.  Avoid shouting at or spanking your child.  Recognize that your child has a limited ability to understand consequences at this age.  Provide your child with choices throughout the  since your last visit? no   Hospitalized since your last visit? no             2) Have you seen or consulted any other health care providers outside of [address] [place] [address] since your last visit? no  (Include any pap smears or colon screenings in this section.)    3) Do you have an Advance Directive on file? no    4) Are you interested in receiving information on Advance Directives? NO      Patient is accompanied by self I have received verbal consent from Chica Bowman to discuss any/all medical information while they are present in the room. Reviewed record  In preparation for visit and have obtained necessary documentation. "day.  When giving your child instructions (not choices), avoid asking yes and no questions (\"Do you want a bath?\"). Instead, give clear instructions (\"Time for a bath.\").  Interrupt your child's inappropriate behavior and show him or her what to do instead. You can also remove your child from the situation and have him or her do a more appropriate activity.  If your child cries to get what he or she wants, wait until your child briefly calms down before you give him or her the item or activity. Also, model the words that your child should use (for example, \"cookie please\" or \"climb up\").  Avoid situations or activities that may cause your child to have a temper tantrum, such as shopping trips.  Oral health    Brush your child's teeth after meals and before bedtime.  Take your child to a dentist to discuss oral health. Ask if you should start using fluoride toothpaste to clean your child's teeth.  Give fluoride supplements or apply fluoride varnish to your child's teeth as told by your child's health care provider.  Provide all beverages in a cup and not in a bottle. Using a cup helps to prevent tooth decay.  Check your child's teeth for brown or white spots. These are signs of tooth decay.  If your child uses a pacifier, try to stop giving it to your child when he or she is awake.  Sleep  Children at this age typically need 12 or more hours of sleep a day and may only take one nap in the afternoon.  Keep naptime and bedtime routines consistent.  Have your child sleep in his or her own sleep space.  Toilet training  When your child becomes aware of wet or soiled diapers and stays dry for longer periods of time, he or she may be ready for toilet training. To toilet train your child:  Let your child see others using the toilet.  Introduce your child to a potty chair.  Give your child lots of praise when he or she successfully uses the potty chair.  Talk with your health care provider if you need help toilet training " your child. Do not force your child to use the toilet. Some children will resist toilet training and may not be trained until 3 years of age. It is normal for boys to be toilet trained later than girls.  What's next?  Your next visit will take place when your child is 30 months old.  Summary  Your child may need certain immunizations to catch up on missed doses.  Depending on your child's risk factors, your child's health care provider may screen for vision and hearing problems, as well as other conditions.  Children this age typically need 12 or more hours of sleep a day and may only take one nap in the afternoon.  Your child may be ready for toilet training when he or she becomes aware of wet or soiled diapers and stays dry for longer periods of time.  Take your child to a dentist to discuss oral health. Ask if you should start using fluoride toothpaste to clean your child's teeth.  This information is not intended to replace advice given to you by your health care provider. Make sure you discuss any questions you have with your health care provider.  Document Released: 01/07/2008 Document Revised: 04/07/2020 Document Reviewed: 09/13/2019  Elsevier Patient Education © 2020 [employer] Inc[employer]

## 2023-08-25 ENCOUNTER — OFFICE VISIT (OUTPATIENT)
Age: 53
End: 2023-08-25
Payer: COMMERCIAL

## 2023-08-25 VITALS
WEIGHT: 164 LBS | TEMPERATURE: 97.5 F | BODY MASS INDEX: 30.18 KG/M2 | DIASTOLIC BLOOD PRESSURE: 75 MMHG | RESPIRATION RATE: 16 BRPM | OXYGEN SATURATION: 99 % | HEIGHT: 62 IN | SYSTOLIC BLOOD PRESSURE: 115 MMHG | HEART RATE: 76 BPM

## 2023-08-25 DIAGNOSIS — L30.9 DERMATITIS, UNSPECIFIED: Primary | ICD-10-CM

## 2023-08-25 DIAGNOSIS — L29.9 EAR ITCHING: ICD-10-CM

## 2023-08-25 PROCEDURE — 3074F SYST BP LT 130 MM HG: CPT | Performed by: NURSE PRACTITIONER

## 2023-08-25 PROCEDURE — 3078F DIAST BP <80 MM HG: CPT | Performed by: NURSE PRACTITIONER

## 2023-08-25 PROCEDURE — 99213 OFFICE O/P EST LOW 20 MIN: CPT | Performed by: NURSE PRACTITIONER

## 2023-08-25 RX ORDER — CIPROFLOXACIN AND DEXAMETHASONE 3; 1 MG/ML; MG/ML
4 SUSPENSION/ DROPS AURICULAR (OTIC) 2 TIMES DAILY
Qty: 7.5 ML | Refills: 0 | Status: SHIPPED | OUTPATIENT
Start: 2023-08-25 | End: 2023-09-01

## 2023-08-25 RX ORDER — FLUTICASONE PROPIONATE 50 MCG
SPRAY, SUSPENSION (ML) NASAL
Qty: 16 G | Refills: 1 | Status: SHIPPED | OUTPATIENT
Start: 2023-08-25

## 2023-08-25 RX ORDER — BETAMETHASONE DIPROPIONATE 0.5 MG/G
OINTMENT TOPICAL 2 TIMES DAILY
Qty: 45 G | Refills: 0 | Status: SHIPPED | OUTPATIENT
Start: 2023-08-25

## 2023-08-25 NOTE — PROGRESS NOTES
Panda Moses (:  1970) is a 45 y.o. female,here for evaluation of the following chief complaint(s):  Ear Drainage        SUBJECTIVE/OBJECTIVE:    HPI:Patient reports itching and dryness of right ear canal for about a month. She notes a discharge at times. No pain. She has history of eczema. Review of Systems   HENT:  Positive for ear discharge and ear pain. Physical Exam  Constitutional:       Appearance: Normal appearance. HENT:      Right Ear: Hearing and tympanic membrane normal.      Left Ear: Hearing, tympanic membrane, ear canal and external ear normal.      Ears:      Comments: Mild swelling of right ear canal, mild pain with exam; flaking and dryness in canal of right ear  Skin:     General: Skin is warm and dry. Neurological:      General: No focal deficit present. Mental Status: She is alert and oriented to person, place, and time. Psychiatric:         Mood and Affect: Mood normal.         Behavior: Behavior normal.        ASSESSMENT/PLAN:  1. Dermatitis, unspecified  -     ciprofloxacin-dexamethasone (CIPRODEX) 0.3-0.1 % otic suspension; Place 4 drops into the right ear 2 times daily for 7 days, Disp-7.5 mL, R-0Normal  -     augmented betamethasone dipropionate (DIPROLENE-AF) 0.05 % ointment; Apply topically 2 times daily, Topical, 2 TIMES DAILY Starting 2023, Disp-45 g, R-0, Normal  2.  Ear itching  -     ciprofloxacin-dexamethasone (CIPRODEX) 0.3-0.1 % otic suspension; Place 4 drops into the right ear 2 times daily for 7 days, Disp-7.5 mL, R-0Normal  Follow-up if no improvement    --UMM Ojeda - NP

## 2023-09-19 RX ORDER — FLUTICASONE PROPIONATE 50 MCG
SPRAY, SUSPENSION (ML) NASAL
Qty: 1 EACH | Refills: 1 | Status: SHIPPED | OUTPATIENT
Start: 2023-09-19

## 2023-09-26 DIAGNOSIS — F51.01 PRIMARY INSOMNIA: ICD-10-CM

## 2023-09-27 RX ORDER — ZOLPIDEM TARTRATE 5 MG/1
5 TABLET ORAL NIGHTLY PRN
Qty: 30 TABLET | Refills: 0 | Status: SHIPPED | OUTPATIENT
Start: 2023-09-27 | End: 2023-10-27

## 2023-09-27 NOTE — TELEPHONE ENCOUNTER
TC to pt. Pt states she will call back this afternoon to schedule CPE with Dr. Kia Yuan.  09/27/23 TR

## 2023-10-25 ENCOUNTER — TELEPHONE (OUTPATIENT)
Age: 53
End: 2023-10-25

## 2023-10-25 NOTE — TELEPHONE ENCOUNTER
----- Message from Bushra Kenyonan sent at 10/24/2023  1:13 PM EDT -----  Subject: Appointment Request    Reason for Call: New Patient/New to Provider Appointment needed: Routine   Existing Condition Follow Up    QUESTIONS    Reason for appointment request? No appointments available during search     Additional Information for Provider? pt would like to est care in this   office with dr Danielle Rojas, pt is in no hurry   ---------------------------------------------------------------------------  --------------  [address] [place]  [phone]; OK to leave message on voicemail  ---------------------------------------------------------------------------  --------------  SCRIPT ANSWERS

## 2024-02-18 DIAGNOSIS — L30.9 DERMATITIS, UNSPECIFIED: ICD-10-CM

## 2024-02-27 RX ORDER — BETAMETHASONE DIPROPIONATE 0.5 MG/G
OINTMENT, AUGMENTED TOPICAL 2 TIMES DAILY
Qty: 45 G | Refills: 0 | OUTPATIENT
Start: 2024-02-27

## 2024-04-23 LAB — MAMMOGRAPHY, EXTERNAL: NORMAL

## 2024-05-06 ENCOUNTER — TELEPHONE (OUTPATIENT)
Age: 54
End: 2024-05-06

## 2024-05-06 NOTE — TELEPHONE ENCOUNTER
Medication Refill Request    Brianda Mcfarlane is requesting a refill of the following medication(s):     Ubrelvy 50 mg tablet 30 Tablet 3 4/10/2023 --    Sig: TAKE 1 TABLET BY MOUTH ONCE AS NEEDED FOR MIGRAINE FOR UP TO 1 DOSE.        Please send refill to:     [facility]/pharmacy #[id number] - [place], [place] - [address] - P [phone] - F [phone]  [address]  [place] [facility] [address]  Phone: [phone] Fax: [phone]

## 2024-05-08 ENCOUNTER — OFFICE VISIT (OUTPATIENT)
Age: 54
End: 2024-05-08
Payer: COMMERCIAL

## 2024-05-08 VITALS
OXYGEN SATURATION: 96 % | WEIGHT: 153.2 LBS | DIASTOLIC BLOOD PRESSURE: 78 MMHG | RESPIRATION RATE: 16 BRPM | SYSTOLIC BLOOD PRESSURE: 109 MMHG | TEMPERATURE: 97.8 F | HEIGHT: 62 IN | HEART RATE: 80 BPM | BODY MASS INDEX: 28.19 KG/M2

## 2024-05-08 DIAGNOSIS — M15.9 OSTEOARTHRITIS OF MULTIPLE JOINTS, UNSPECIFIED OSTEOARTHRITIS TYPE: ICD-10-CM

## 2024-05-08 DIAGNOSIS — G43.909 MIGRAINE WITHOUT STATUS MIGRAINOSUS, NOT INTRACTABLE, UNSPECIFIED MIGRAINE TYPE: Primary | ICD-10-CM

## 2024-05-08 PROCEDURE — 3074F SYST BP LT 130 MM HG: CPT | Performed by: NURSE PRACTITIONER

## 2024-05-08 PROCEDURE — 99213 OFFICE O/P EST LOW 20 MIN: CPT | Performed by: NURSE PRACTITIONER

## 2024-05-08 PROCEDURE — 3078F DIAST BP <80 MM HG: CPT | Performed by: NURSE PRACTITIONER

## 2024-05-08 RX ORDER — IBUPROFEN 800 MG/1
800 TABLET ORAL EVERY 8 HOURS PRN
Qty: 90 TABLET | Refills: 1 | Status: SHIPPED | OUTPATIENT
Start: 2024-05-08

## 2024-05-08 RX ORDER — CYCLOBENZAPRINE HCL 10 MG
10 TABLET ORAL 3 TIMES DAILY PRN
Qty: 30 TABLET | Refills: 1 | Status: SHIPPED | OUTPATIENT
Start: 2024-05-08

## 2024-05-08 SDOH — ECONOMIC STABILITY: FOOD INSECURITY: WITHIN THE PAST 12 MONTHS, YOU WORRIED THAT YOUR FOOD WOULD RUN OUT BEFORE YOU GOT MONEY TO BUY MORE.: NEVER TRUE

## 2024-05-08 SDOH — ECONOMIC STABILITY: FOOD INSECURITY: WITHIN THE PAST 12 MONTHS, THE FOOD YOU BOUGHT JUST DIDN'T LAST AND YOU DIDN'T HAVE MONEY TO GET MORE.: NEVER TRUE

## 2024-05-08 SDOH — ECONOMIC STABILITY: HOUSING INSECURITY
IN THE LAST 12 MONTHS, WAS THERE A TIME WHEN YOU DID NOT HAVE A STEADY PLACE TO SLEEP OR SLEPT IN A SHELTER (INCLUDING NOW)?: NO

## 2024-05-08 SDOH — ECONOMIC STABILITY: INCOME INSECURITY: HOW HARD IS IT FOR YOU TO PAY FOR THE VERY BASICS LIKE FOOD, HOUSING, MEDICAL CARE, AND HEATING?: NOT HARD AT ALL

## 2024-05-08 ASSESSMENT — PATIENT HEALTH QUESTIONNAIRE - PHQ9
SUM OF ALL RESPONSES TO PHQ QUESTIONS 1-9: 0
8. MOVING OR SPEAKING SO SLOWLY THAT OTHER PEOPLE COULD HAVE NOTICED. OR THE OPPOSITE, BEING SO FIGETY OR RESTLESS THAT YOU HAVE BEEN MOVING AROUND A LOT MORE THAN USUAL: NOT AT ALL
SUM OF ALL RESPONSES TO PHQ QUESTIONS 1-9: 0
5. POOR APPETITE OR OVEREATING: NOT AT ALL
SUM OF ALL RESPONSES TO PHQ9 QUESTIONS 1 & 2: 0
9. THOUGHTS THAT YOU WOULD BE BETTER OFF DEAD, OR OF HURTING YOURSELF: NOT AT ALL
SUM OF ALL RESPONSES TO PHQ QUESTIONS 1-9: 0
7. TROUBLE CONCENTRATING ON THINGS, SUCH AS READING THE NEWSPAPER OR WATCHING TELEVISION: NOT AT ALL
10. IF YOU CHECKED OFF ANY PROBLEMS, HOW DIFFICULT HAVE THESE PROBLEMS MADE IT FOR YOU TO DO YOUR WORK, TAKE CARE OF THINGS AT HOME, OR GET ALONG WITH OTHER PEOPLE: NOT DIFFICULT AT ALL
4. FEELING TIRED OR HAVING LITTLE ENERGY: NOT AT ALL
2. FEELING DOWN, DEPRESSED OR HOPELESS: NOT AT ALL
SUM OF ALL RESPONSES TO PHQ QUESTIONS 1-9: 0
3. TROUBLE FALLING OR STAYING ASLEEP: NOT AT ALL
6. FEELING BAD ABOUT YOURSELF - OR THAT YOU ARE A FAILURE OR HAVE LET YOURSELF OR YOUR FAMILY DOWN: NOT AT ALL
1. LITTLE INTEREST OR PLEASURE IN DOING THINGS: NOT AT ALL

## 2024-05-08 NOTE — PROGRESS NOTES
Brianda Mcfarlane (:  1970) is a 53 y.o. female,here for evaluation of the following chief complaint(s):  Migraine (Started ongoing )    /78   Pulse 80   Temp 97.8 °F (36.6 °C) (Temporal)   Resp 16   Ht 1.575 m (5' 2\")   Wt 69.5 kg (153 lb 3.2 oz)   SpO2 96%   BMI 28.02 kg/m²       SUBJECTIVE/OBJECTIVE:    HPI:Patient presents for refill of Ubrelvy for migraines. She has upcoming trip out of the country. She also requests refill of flexeril and ibuprofen she takes as needed for joint pain, mostly in knees and back.    Review of Systems   Musculoskeletal:  Positive for arthralgias.   Neurological:  Positive for headaches.       Physical Exam  Constitutional:       Appearance: Normal appearance.   Skin:     General: Skin is warm and dry.   Neurological:      General: No focal deficit present.      Mental Status: She is alert and oriented to person, place, and time.   Psychiatric:         Mood and Affect: Mood normal.         Behavior: Behavior normal.          ASSESSMENT/PLAN:  1. Migraine without status migrainosus, not intractable, unspecified migraine type  -     Ubrogepant 50 MG TABS; TAKE 1 TABLET BY MOUTH ONCE AS NEEDED FOR MIGRAINE FOR UP TO 1 DOSE, Disp-30 tablet, R-2Normal  2. Osteoarthritis of multiple joints, unspecified osteoarthritis type  Follow-up with new PCP in July as scheduled    --UMM Walter - NP   
Patient unable to ambulate due to pain and is chair-bound at home.

## 2024-05-26 NOTE — TELEPHONE ENCOUNTER
Last prescribed as 30 day supply w/ 0 refill.  Do you authorize additional refills for diclofenac No

## 2024-07-02 ENCOUNTER — OFFICE VISIT (OUTPATIENT)
Age: 54
End: 2024-07-02
Payer: COMMERCIAL

## 2024-07-02 VITALS
BODY MASS INDEX: 26.58 KG/M2 | TEMPERATURE: 98.1 F | HEART RATE: 75 BPM | OXYGEN SATURATION: 98 % | HEIGHT: 63 IN | SYSTOLIC BLOOD PRESSURE: 119 MMHG | WEIGHT: 150 LBS | DIASTOLIC BLOOD PRESSURE: 82 MMHG | RESPIRATION RATE: 16 BRPM

## 2024-07-02 DIAGNOSIS — Z13.1 DIABETES MELLITUS SCREENING: ICD-10-CM

## 2024-07-02 DIAGNOSIS — Z13.6 ENCOUNTER FOR LIPID SCREENING FOR CARDIOVASCULAR DISEASE: ICD-10-CM

## 2024-07-02 DIAGNOSIS — Z13.220 ENCOUNTER FOR LIPID SCREENING FOR CARDIOVASCULAR DISEASE: ICD-10-CM

## 2024-07-02 DIAGNOSIS — F51.01 PRIMARY INSOMNIA: Primary | ICD-10-CM

## 2024-07-02 DIAGNOSIS — G89.29 CHRONIC PAIN OF RIGHT KNEE: ICD-10-CM

## 2024-07-02 DIAGNOSIS — G43.909 MIGRAINE WITHOUT STATUS MIGRAINOSUS, NOT INTRACTABLE, UNSPECIFIED MIGRAINE TYPE: ICD-10-CM

## 2024-07-02 DIAGNOSIS — Z00.00 ADULT GENERAL MEDICAL EXAM: ICD-10-CM

## 2024-07-02 DIAGNOSIS — M25.561 CHRONIC PAIN OF RIGHT KNEE: ICD-10-CM

## 2024-07-02 PROCEDURE — 99214 OFFICE O/P EST MOD 30 MIN: CPT | Performed by: STUDENT IN AN ORGANIZED HEALTH CARE EDUCATION/TRAINING PROGRAM

## 2024-07-02 PROCEDURE — 3074F SYST BP LT 130 MM HG: CPT | Performed by: STUDENT IN AN ORGANIZED HEALTH CARE EDUCATION/TRAINING PROGRAM

## 2024-07-02 PROCEDURE — 3079F DIAST BP 80-89 MM HG: CPT | Performed by: STUDENT IN AN ORGANIZED HEALTH CARE EDUCATION/TRAINING PROGRAM

## 2024-07-02 RX ORDER — IBUPROFEN 800 MG/1
800 TABLET ORAL EVERY 8 HOURS PRN
Qty: 90 TABLET | Refills: 1 | Status: SHIPPED | OUTPATIENT
Start: 2024-07-02

## 2024-07-02 RX ORDER — TRAZODONE HYDROCHLORIDE 50 MG/1
50 TABLET ORAL NIGHTLY
Qty: 90 TABLET | Refills: 1 | Status: SHIPPED | OUTPATIENT
Start: 2024-07-02

## 2024-07-02 RX ORDER — ZOLPIDEM TARTRATE 5 MG/1
5 TABLET ORAL NIGHTLY PRN
Qty: 30 TABLET | Refills: 0 | Status: SHIPPED | OUTPATIENT
Start: 2024-07-02 | End: 2024-08-01

## 2024-07-02 RX ORDER — ZOLPIDEM TARTRATE 5 MG/1
5 TABLET ORAL NIGHTLY PRN
COMMUNITY
End: 2024-07-02 | Stop reason: SDUPTHER

## 2024-07-02 ASSESSMENT — ENCOUNTER SYMPTOMS
ABDOMINAL PAIN: 0
CONSTIPATION: 0
BLOOD IN STOOL: 0
VOMITING: 0
NAUSEA: 0
DIARRHEA: 0
COUGH: 0
SHORTNESS OF BREATH: 0

## 2024-07-02 NOTE — ASSESSMENT & PLAN NOTE
Cont Ambien for now, she is open to trying non habit-forming medication for sleep. Will try trazodone, she will let me know in a few weeks how it is working

## 2024-07-02 NOTE — PROGRESS NOTES
Brianda Mcfarlane is a 53 y.o. year old female who is a new patient to me today (07/02/24).  She was previously followed by Dr Mason.      Assessment & Plan:   1. Primary insomnia  Assessment & Plan:  Cont Ambien for now, she is open to trying non habit-forming medication for sleep. Will try trazodone, she will let me know in a few weeks how it is working  Orders:  -     traZODone (DESYREL) 50 MG tablet; Take 1 tablet by mouth nightly, Disp-90 tablet, R-1Normal  -     zolpidem (AMBIEN) 5 MG tablet; Take 1 tablet by mouth nightly as needed for Sleep for up to 30 days. Max Daily Amount: 5 mg, Disp-30 tablet, R-0Normal  2. Chronic pain of right knee  -     [facility] - Dorothy Garcia MD, Orthopedic Surgery (knee), Mike (Pauline Yanez)  3. Migraine without status migrainosus, not intractable, unspecified migraine type  Assessment & Plan:  Cont Ubrelvy PRN  4. Adult general medical exam  -     Comprehensive Metabolic Panel  -     CBC  5. Diabetes mellitus screening  -     Hemoglobin A1C  6. Encounter for lipid screening for cardiovascular disease  -     Lipid Panel        Return in about 1 year (around 7/2/2025) for Annual Physical (or 6 months if needed for controlled substance refill).      Subjective:   Patient is here today for evaluation of the following chief complaint(s): New Patient, Discuss Medications (Would like refill on ambien), Knee Pain (Chronic right knee pain worse over last month ), and Leg Pain (Bilateral leg pain, right worse )    Insomnia  - Has been on ambien for a few years, uses PRN  - Reports she started to have problems sleeping after menopause  - Has not tried any other medication for sleep    Right knee pain  - Having worsening right knee pain over the last couple of months  - Has been told that she has osteoarthritis in the past  - Takes ibuprofen 800mg as needed    Bilateral leg pain  - Says her legs ache occasionally, usually worse after she has been working and on her feet for long

## 2024-07-02 NOTE — PROGRESS NOTES
Verified name and birth date for privacy precautions.   Chart reviewed in preparation for today's visit.     Chief Complaint   Patient presents with    New Patient    Discuss Medications     Would like refill on ambien    Knee Pain     Chronic right knee pain worse over last month     Leg Pain     Bilateral leg pain, right worse           Health Maintenance Due   Topic    Hepatitis B vaccine (1 of 3 - 3-dose series)    COVID-19 Vaccine (1)    HIV screen     Hepatitis C screen     DTaP/Tdap/Td vaccine (1 - Tdap)    Shingles vaccine (1 of 2)    Diabetes screen     Breast cancer screen          Wt Readings from Last 3 Encounters:   07/02/24 68 kg (150 lb)   05/08/24 69.5 kg (153 lb 3.2 oz)   08/25/23 74.4 kg (164 lb)     Temp Readings from Last 3 Encounters:   07/02/24 98.1 °F (36.7 °C) (Oral)   05/08/24 97.8 °F (36.6 °C) (Temporal)   08/25/23 97.5 °F (36.4 °C) (Temporal)     BP Readings from Last 3 Encounters:   07/02/24 119/82   05/08/24 109/78   08/25/23 115/75     Pulse Readings from Last 3 Encounters:   07/02/24 75   05/08/24 80   08/25/23 76       Social Determinants of Health     Tobacco Use: Low Risk  (7/2/2024)    Patient History     Smoking Tobacco Use: Never     Smokeless Tobacco Use: Never     Passive Exposure: Not on file   Alcohol Use: Not on file   Financial Resource Strain: Low Risk  (5/8/2024)    Overall Financial Resource Strain (CARDIA)     Difficulty of Paying Living Expenses: Not hard at all   Food Insecurity: No Food Insecurity (5/8/2024)    Hunger Vital Sign     Worried About Running Out of Food in the Last Year: Never true     Ran Out of Food in the Last Year: Never true   Transportation Needs: Unknown (5/8/2024)    PRAPARE - Transportation     Lack of Transportation (Medical): Not on file     Lack of Transportation (Non-Medical): No   Physical Activity: Not on file   Stress: Not on file   Social Connections: Not on file   Intimate Partner Violence: Not on file   Depression: Not at risk

## 2024-07-08 DIAGNOSIS — L30.9 DERMATITIS, UNSPECIFIED: ICD-10-CM

## 2024-07-09 RX ORDER — BETAMETHASONE DIPROPIONATE 0.5 MG/G
OINTMENT, AUGMENTED TOPICAL 2 TIMES DAILY
Qty: 45 G | Refills: 0 | Status: SHIPPED | OUTPATIENT
Start: 2024-07-09

## 2024-08-22 LAB — HBA1C MFR BLD HPLC: 5.8 %

## 2024-08-29 RX ORDER — IBUPROFEN 800 MG/1
800 TABLET, FILM COATED ORAL EVERY 8 HOURS PRN
Qty: 90 TABLET | Refills: 1 | Status: SHIPPED | OUTPATIENT
Start: 2024-08-29

## 2024-08-29 NOTE — TELEPHONE ENCOUNTER
Last office visit 7/2/24  No follow up, not due until July 2025    Last fill on ibuprofen 7/2/24 #90 with 1 additional refill

## 2024-09-18 ENCOUNTER — TELEPHONE (OUTPATIENT)
Age: 54
End: 2024-09-18

## 2024-09-19 ENCOUNTER — TELEPHONE (OUTPATIENT)
Age: 54
End: 2024-09-19

## 2024-09-19 NOTE — TELEPHONE ENCOUNTER
PSR called lab at [facility] re labs order by Dr. Vaughn on 07/02/24. PSR advised lab that pt stated she went to [facility] to have labs done. [facility] stated pt did and results are back. After PSR veried some information on pt, Lab stated they would fax lab report to Dr. Vaughn at fax number: [phone]. PSR provided office number incase they had any trouble with faxing report. PSR call pt to let her know what PSR did and not to worry about calling lab at [facility]. Pt verbalized understanding and appreciated all the help.

## 2024-10-23 ENCOUNTER — TELEPHONE (OUTPATIENT)
Age: 54
End: 2024-10-23

## 2024-10-23 NOTE — TELEPHONE ENCOUNTER
Spoke with patient and advised her that we may have received them all but they might not be scanned in yet. I would give it more time because the A1c was just scanned in yesterday

## 2024-10-23 NOTE — TELEPHONE ENCOUNTER
Patient requesting someone call her with her lab results from July.  Is only able to see her hemoglobin A1C results in .  Would like to speak with someone.

## 2024-10-30 ENCOUNTER — TELEPHONE (OUTPATIENT)
Age: 54
End: 2024-10-30

## 2024-10-30 NOTE — TELEPHONE ENCOUNTER
Reason for call:  Spoke with pt. Pt requesting someone call her with her lab results from July.     Is this a new problem: Yes    Date of last appointment:  10/30/2024     Can we respond via [facility]: No    Best call back number: Brianda Mcfarlane   [phone]

## 2024-11-06 ENCOUNTER — OFFICE VISIT (OUTPATIENT)
Age: 54
End: 2024-11-06
Payer: COMMERCIAL

## 2024-11-06 VITALS
HEART RATE: 67 BPM | HEIGHT: 63 IN | TEMPERATURE: 97 F | DIASTOLIC BLOOD PRESSURE: 75 MMHG | SYSTOLIC BLOOD PRESSURE: 114 MMHG | OXYGEN SATURATION: 100 % | BODY MASS INDEX: 28.14 KG/M2 | RESPIRATION RATE: 16 BRPM | WEIGHT: 158.8 LBS

## 2024-11-06 DIAGNOSIS — L03.116 CELLULITIS OF LEFT LEG: Primary | ICD-10-CM

## 2024-11-06 PROCEDURE — 3078F DIAST BP <80 MM HG: CPT | Performed by: NURSE PRACTITIONER

## 2024-11-06 PROCEDURE — 3074F SYST BP LT 130 MM HG: CPT | Performed by: NURSE PRACTITIONER

## 2024-11-06 PROCEDURE — 99213 OFFICE O/P EST LOW 20 MIN: CPT | Performed by: NURSE PRACTITIONER

## 2024-11-06 ASSESSMENT — PATIENT HEALTH QUESTIONNAIRE - PHQ9: DEPRESSION UNABLE TO ASSESS: PT REFUSES

## 2024-11-06 NOTE — PROGRESS NOTES
Brianda Mcfarlane (:  1970) is a 54 y.o. female,here for evaluation of the following chief complaint(s):  Insect Bite (Started saturday)    /75   Pulse 67   Temp 97 °F (36.1 °C) (Temporal)   Resp 16   Ht 1.6 m (5' 3\")   Wt 72 kg (158 lb 12.8 oz)   SpO2 100%   BMI 28.13 kg/m²       SUBJECTIVE/OBJECTIVE:    HPI:Patient reports an insect bite on left calf on Saturday. She believes it was a stinging insect. She notes increased redness, warmth, and swelling of left lower extremity by the next day. She went to [facility] urgent care 2 days ago due to small blistering and red streaking on left leg. She has swelling that extended to her ankle also. She was started on doxycycline and amoxicillin. She notes improvement in redness, warmth, and swelling.    Review of Systems   Skin:  Positive for wound.       Physical Exam  Skin:     Comments: Small excoriated lesions of left calf in a cluster with some fluctuance and redness.                ASSESSMENT/PLAN:  1. Cellulitis of left leg  Continue antibiotics as prescribed  Ice application  Daily probiotic  Follow-up if no improvement    --UMM Walter - NP

## 2024-12-20 DIAGNOSIS — L30.9 DERMATITIS, UNSPECIFIED: ICD-10-CM

## 2024-12-23 ENCOUNTER — OFFICE VISIT (OUTPATIENT)
Age: 54
End: 2024-12-23
Payer: COMMERCIAL

## 2024-12-23 VITALS
BODY MASS INDEX: 28.21 KG/M2 | HEIGHT: 63 IN | HEART RATE: 71 BPM | TEMPERATURE: 98 F | RESPIRATION RATE: 16 BRPM | DIASTOLIC BLOOD PRESSURE: 82 MMHG | OXYGEN SATURATION: 100 % | SYSTOLIC BLOOD PRESSURE: 122 MMHG | WEIGHT: 159.2 LBS

## 2024-12-23 DIAGNOSIS — M79.641 BILATERAL HAND PAIN: Primary | ICD-10-CM

## 2024-12-23 DIAGNOSIS — M79.642 BILATERAL HAND PAIN: Primary | ICD-10-CM

## 2024-12-23 DIAGNOSIS — M15.9 OSTEOARTHRITIS OF MULTIPLE JOINTS, UNSPECIFIED OSTEOARTHRITIS TYPE: ICD-10-CM

## 2024-12-23 PROCEDURE — 3074F SYST BP LT 130 MM HG: CPT | Performed by: STUDENT IN AN ORGANIZED HEALTH CARE EDUCATION/TRAINING PROGRAM

## 2024-12-23 PROCEDURE — 3079F DIAST BP 80-89 MM HG: CPT | Performed by: STUDENT IN AN ORGANIZED HEALTH CARE EDUCATION/TRAINING PROGRAM

## 2024-12-23 PROCEDURE — 99213 OFFICE O/P EST LOW 20 MIN: CPT | Performed by: STUDENT IN AN ORGANIZED HEALTH CARE EDUCATION/TRAINING PROGRAM

## 2024-12-23 RX ORDER — BETAMETHASONE DIPROPIONATE 0.5 MG/G
OINTMENT, AUGMENTED TOPICAL 2 TIMES DAILY
Qty: 45 G | Refills: 0 | Status: SHIPPED | OUTPATIENT
Start: 2024-12-23

## 2024-12-23 ASSESSMENT — PATIENT HEALTH QUESTIONNAIRE - PHQ9: DEPRESSION UNABLE TO ASSESS: PT REFUSES

## 2024-12-23 NOTE — TELEPHONE ENCOUNTER
Pt last seen on 7/2/24. Due to return in one year.    Has no appt scheduled at this time. Will forward to front office pool to call pt.    Rx last filled on 79/24 #45 g/0RF.    Will forward to MD for refill.

## 2024-12-23 NOTE — PROGRESS NOTES
Brianda Mcfarlane is a 54 y.o. year old female who presents today (12/23/24) for follow-up/ Pain (All over body /Started ongoing )      Assessment & Plan:     Assessment & Plan  Bilateral hand pain    Neg RF and LENO last year,  making RA unlikely though she does have family hx in her sister. Additionally no suggestive findings on exam. Will proceed with imaging of her hands to assess further. If XR findings suggestive of RA will refer to rheumatology for evaluation on possible seronegative disease.    Orders:    XR HAND BILATERAL MINIMUM 3 VW; Future    Osteoarthritis of multiple joints, unspecified osteoarthritis type    Chronic, prefers not to take medication if she can avoid it though is having more frequent pain. We discussed trial of Cymbalta - she will think about this and let me know if she wishes to proceed.     RTC in 6 months for CPE or sooner PRN    Subjective:   Reports continued joint pain in her elbows, knees, and both hands. She has had evaluation for RA and other autoimmune causes of arthritis in the past which were negative.   She says she also experiences stiffness after she has been sitting for some time.     Recent specialist visits:  None       Review of Systems:  Review of Systems      PMHx:    Patient Active Problem List   Diagnosis    Obesity    Migraines    HTN (hypertension)    Depression    Primary insomnia       Prior to Admission medications    Medication Sig Start Date End Date Taking? Authorizing Provider   augmented betamethasone dipropionate (DIPROLENE-AF) 0.05 % ointment APPLY TO AFFECTED AREA TWICE A DAY 12/23/24  Yes Kj Vaughn, DO   ibuprofen (ADVIL;MOTRIN) 800 MG tablet TAKE 1 TABLET BY MOUTH EVERY 8 HOURS AS NEEDED FOR PAIN 8/29/24  Yes Kj Vaughn, DO   Omega-3 Fatty Acids (FISH OIL) 300 MG CAPS Take by mouth daily   Yes Jagdish Haile MD   Multiple Vitamin (MULTIVITAMIN ADULT PO) Take 1 tablet by mouth daily   Yes Jagdish Haile MD   traZODone

## 2024-12-24 NOTE — ASSESSMENT & PLAN NOTE
Chronic, prefers not to take medication if she can avoid it though is having more frequent pain. We discussed trial of Cymbalta - she will think about this and let me know if she wishes to proceed.

## 2025-07-30 ENCOUNTER — OFFICE VISIT (OUTPATIENT)
Age: 55
End: 2025-07-30
Payer: COMMERCIAL

## 2025-07-30 VITALS
HEART RATE: 66 BPM | OXYGEN SATURATION: 99 % | BODY MASS INDEX: 28.42 KG/M2 | TEMPERATURE: 97.3 F | SYSTOLIC BLOOD PRESSURE: 133 MMHG | WEIGHT: 160.4 LBS | DIASTOLIC BLOOD PRESSURE: 87 MMHG | RESPIRATION RATE: 16 BRPM | HEIGHT: 63 IN

## 2025-07-30 DIAGNOSIS — G56.03 BILATERAL CARPAL TUNNEL SYNDROME: Primary | ICD-10-CM

## 2025-07-30 PROCEDURE — 3079F DIAST BP 80-89 MM HG: CPT | Performed by: NURSE PRACTITIONER

## 2025-07-30 PROCEDURE — 99213 OFFICE O/P EST LOW 20 MIN: CPT | Performed by: NURSE PRACTITIONER

## 2025-07-30 PROCEDURE — 3075F SYST BP GE 130 - 139MM HG: CPT | Performed by: NURSE PRACTITIONER

## 2025-07-30 SDOH — ECONOMIC STABILITY: FOOD INSECURITY: WITHIN THE PAST 12 MONTHS, YOU WORRIED THAT YOUR FOOD WOULD RUN OUT BEFORE YOU GOT MONEY TO BUY MORE.: NEVER TRUE

## 2025-07-30 SDOH — ECONOMIC STABILITY: FOOD INSECURITY: WITHIN THE PAST 12 MONTHS, THE FOOD YOU BOUGHT JUST DIDN'T LAST AND YOU DIDN'T HAVE MONEY TO GET MORE.: NEVER TRUE

## 2025-07-30 ASSESSMENT — PATIENT HEALTH QUESTIONNAIRE - PHQ9
9. THOUGHTS THAT YOU WOULD BE BETTER OFF DEAD, OR OF HURTING YOURSELF: NOT AT ALL
7. TROUBLE CONCENTRATING ON THINGS, SUCH AS READING THE NEWSPAPER OR WATCHING TELEVISION: NOT AT ALL
3. TROUBLE FALLING OR STAYING ASLEEP: NEARLY EVERY DAY
2. FEELING DOWN, DEPRESSED OR HOPELESS: NOT AT ALL
10. IF YOU CHECKED OFF ANY PROBLEMS, HOW DIFFICULT HAVE THESE PROBLEMS MADE IT FOR YOU TO DO YOUR WORK, TAKE CARE OF THINGS AT HOME, OR GET ALONG WITH OTHER PEOPLE: NOT DIFFICULT AT ALL
SUM OF ALL RESPONSES TO PHQ QUESTIONS 1-9: 6
4. FEELING TIRED OR HAVING LITTLE ENERGY: NEARLY EVERY DAY
SUM OF ALL RESPONSES TO PHQ QUESTIONS 1-9: 6
SUM OF ALL RESPONSES TO PHQ QUESTIONS 1-9: 6
5. POOR APPETITE OR OVEREATING: NOT AT ALL
SUM OF ALL RESPONSES TO PHQ QUESTIONS 1-9: 6
6. FEELING BAD ABOUT YOURSELF - OR THAT YOU ARE A FAILURE OR HAVE LET YOURSELF OR YOUR FAMILY DOWN: NOT AT ALL
1. LITTLE INTEREST OR PLEASURE IN DOING THINGS: NOT AT ALL
8. MOVING OR SPEAKING SO SLOWLY THAT OTHER PEOPLE COULD HAVE NOTICED. OR THE OPPOSITE, BEING SO FIGETY OR RESTLESS THAT YOU HAVE BEEN MOVING AROUND A LOT MORE THAN USUAL: NOT AT ALL

## 2025-07-30 NOTE — PROGRESS NOTES
Brianda Mcfarlane (:  1970) is a 54 y.o. female,here for evaluation of the following chief complaint(s):  Numbness (Patient here today concerned with numbness in hands for a few months.Pt denies pain. Bella Miller CMA )    /87   Pulse 66   Temp 97.3 °F (36.3 °C) (Temporal)   Resp 16   Ht 1.6 m (5' 3\")   Wt 72.8 kg (160 lb 6.4 oz)   SpO2 99%   BMI 28.41 kg/m²       SUBJECTIVE/OBJECTIVE:    HPI:Patient reports numbness in hands over the past few months. It was initially just at night, but worse now during the day. It seems worse if she holds her phone. She has not worn a brace. 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    Review of Systems   Musculoskeletal:         Wrist pain       Physical Exam  Constitutional:       Appearance: Normal appearance.   Musculoskeletal:      Comments: Positive tinel's and phalen's test of both wrists; no swelling of wrists   Skin:     General: Skin is warm and dry.   Neurological:      General: No focal deficit present.      Mental Status: She is alert and oriented to person, place, and time.   Psychiatric:         Mood and Affect: Mood normal.         Behavior: Behavior normal.          ASSESSMENT/PLAN:  1. Bilateral carpal tunnel syndrome  -     BS - Dimitry Robles MD, Orthopedic Surgery (elbow, hand, wrist), [facility] ([address])  Wrist splints day and night x 6 weeks  Follow-up with ortho if no improvement    --[facility] - [place]

## 2025-08-22 ENCOUNTER — TELEPHONE (OUTPATIENT)
Age: 55
End: 2025-08-22

## 2025-09-02 ENCOUNTER — TELEPHONE (OUTPATIENT)
Age: 55
End: 2025-09-02